# Patient Record
Sex: FEMALE | Race: WHITE | NOT HISPANIC OR LATINO
[De-identification: names, ages, dates, MRNs, and addresses within clinical notes are randomized per-mention and may not be internally consistent; named-entity substitution may affect disease eponyms.]

---

## 2017-01-31 ENCOUNTER — APPOINTMENT (OUTPATIENT)
Dept: ALLERGY | Facility: CLINIC | Age: 27
End: 2017-01-31

## 2017-01-31 ENCOUNTER — RECORD ABSTRACTING (OUTPATIENT)
Age: 27
End: 2017-01-31

## 2017-01-31 VITALS
SYSTOLIC BLOOD PRESSURE: 110 MMHG | RESPIRATION RATE: 14 BRPM | WEIGHT: 115 LBS | HEART RATE: 80 BPM | DIASTOLIC BLOOD PRESSURE: 80 MMHG | BODY MASS INDEX: 20.38 KG/M2 | HEIGHT: 63 IN

## 2017-01-31 DIAGNOSIS — J30.9 ALLERGIC RHINITIS, UNSPECIFIED: ICD-10-CM

## 2017-01-31 DIAGNOSIS — Z87.891 PERSONAL HISTORY OF NICOTINE DEPENDENCE: ICD-10-CM

## 2017-01-31 DIAGNOSIS — J45.20 MILD INTERMITTENT ASTHMA, UNCOMPLICATED: ICD-10-CM

## 2017-01-31 RX ORDER — SERTRALINE HYDROCHLORIDE 50 MG/1
50 TABLET, FILM COATED ORAL
Refills: 0 | Status: ACTIVE | COMMUNITY

## 2017-01-31 RX ORDER — TRAZODONE HYDROCHLORIDE 50 MG/1
50 TABLET ORAL
Refills: 0 | Status: ACTIVE | COMMUNITY

## 2017-01-31 RX ORDER — FLUTICASONE PROPIONATE 50 UG/1
50 SPRAY, METERED NASAL DAILY
Qty: 30 | Refills: 2 | Status: ACTIVE | COMMUNITY
Start: 2017-01-31 | End: 1900-01-01

## 2017-01-31 RX ORDER — ALBUTEROL SULFATE 90 UG/1
108 (90 BASE) AEROSOL, METERED RESPIRATORY (INHALATION)
Qty: 1 | Refills: 2 | Status: ACTIVE | COMMUNITY
Start: 2017-01-31 | End: 1900-01-01

## 2017-01-31 RX ORDER — CETIRIZINE HCL 10 MG
TABLET ORAL
Refills: 0 | Status: ACTIVE | COMMUNITY

## 2017-01-31 RX ORDER — DROSPIRENONE AND ETHINYL ESTRADIOL 0.03MG-3MG
KIT ORAL
Refills: 0 | Status: ACTIVE | COMMUNITY

## 2017-02-06 ENCOUNTER — APPOINTMENT (OUTPATIENT)
Dept: ALLERGY | Facility: CLINIC | Age: 27
End: 2017-02-06

## 2017-02-13 ENCOUNTER — APPOINTMENT (OUTPATIENT)
Dept: ALLERGY | Facility: CLINIC | Age: 27
End: 2017-02-13

## 2017-02-15 ENCOUNTER — APPOINTMENT (OUTPATIENT)
Dept: ALLERGY | Facility: CLINIC | Age: 27
End: 2017-02-15

## 2017-02-16 ENCOUNTER — APPOINTMENT (OUTPATIENT)
Dept: ALLERGY | Facility: CLINIC | Age: 27
End: 2017-02-16

## 2017-02-27 ENCOUNTER — APPOINTMENT (OUTPATIENT)
Dept: ALLERGY | Facility: CLINIC | Age: 27
End: 2017-02-27

## 2017-02-27 VITALS — WEIGHT: 115 LBS | HEIGHT: 63 IN | BODY MASS INDEX: 20.38 KG/M2 | RESPIRATION RATE: 14 BRPM | HEART RATE: 72 BPM

## 2017-03-01 ENCOUNTER — APPOINTMENT (OUTPATIENT)
Dept: ALLERGY | Facility: CLINIC | Age: 27
End: 2017-03-01

## 2017-03-02 ENCOUNTER — APPOINTMENT (OUTPATIENT)
Dept: ALLERGY | Facility: CLINIC | Age: 27
End: 2017-03-02

## 2017-04-17 RX ORDER — FLUTICASONE PROPIONATE 50 UG/1
50 SPRAY, METERED NASAL DAILY
Qty: 1 | Refills: 3 | Status: ACTIVE | COMMUNITY
Start: 2017-04-17 | End: 1900-01-01

## 2017-06-19 RX ORDER — FLUTICASONE PROPIONATE 50 UG/1
50 SPRAY, METERED NASAL DAILY
Qty: 1 | Refills: 2 | Status: ACTIVE | COMMUNITY
Start: 2017-06-19 | End: 1900-01-01

## 2017-09-04 ENCOUNTER — EMERGENCY (EMERGENCY)
Facility: HOSPITAL | Age: 27
LOS: 1 days | Discharge: HOME | End: 2017-09-04
Attending: EMERGENCY MEDICINE | Admitting: EMERGENCY MEDICINE
Payer: COMMERCIAL

## 2017-09-04 VITALS
OXYGEN SATURATION: 100 % | SYSTOLIC BLOOD PRESSURE: 115 MMHG | TEMPERATURE: 99 F | RESPIRATION RATE: 16 BRPM | DIASTOLIC BLOOD PRESSURE: 76 MMHG | HEART RATE: 88 BPM

## 2017-09-04 VITALS
HEART RATE: 113 BPM | RESPIRATION RATE: 17 BRPM | DIASTOLIC BLOOD PRESSURE: 92 MMHG | OXYGEN SATURATION: 100 % | TEMPERATURE: 98 F | SYSTOLIC BLOOD PRESSURE: 138 MMHG

## 2017-09-04 LAB
ALBUMIN SERPL ELPH-MCNC: 3.9 G/DL — SIGNIFICANT CHANGE UP (ref 3.3–5)
ALP SERPL-CCNC: 55 U/L — SIGNIFICANT CHANGE UP (ref 40–120)
ALT FLD-CCNC: 13 U/L RC — SIGNIFICANT CHANGE UP (ref 10–45)
ANION GAP SERPL CALC-SCNC: 17 MMOL/L — SIGNIFICANT CHANGE UP (ref 5–17)
APPEARANCE UR: ABNORMAL
AST SERPL-CCNC: 20 U/L — SIGNIFICANT CHANGE UP (ref 10–40)
BACTERIA # UR AUTO: ABNORMAL /HPF
BILIRUB SERPL-MCNC: 0.5 MG/DL — SIGNIFICANT CHANGE UP (ref 0.2–1.2)
BILIRUB UR-MCNC: NEGATIVE — SIGNIFICANT CHANGE UP
BUN SERPL-MCNC: 8 MG/DL — SIGNIFICANT CHANGE UP (ref 7–23)
CALCIUM SERPL-MCNC: 9 MG/DL — SIGNIFICANT CHANGE UP (ref 8.4–10.5)
CHLORIDE SERPL-SCNC: 89 MMOL/L — LOW (ref 96–108)
CO2 SERPL-SCNC: 23 MMOL/L — SIGNIFICANT CHANGE UP (ref 22–31)
COLOR SPEC: YELLOW — SIGNIFICANT CHANGE UP
CREAT SERPL-MCNC: 0.53 MG/DL — SIGNIFICANT CHANGE UP (ref 0.5–1.3)
DIFF PNL FLD: ABNORMAL
EPI CELLS # UR: SIGNIFICANT CHANGE UP /HPF
GLUCOSE SERPL-MCNC: 90 MG/DL — SIGNIFICANT CHANGE UP (ref 70–99)
GLUCOSE UR QL: NEGATIVE — SIGNIFICANT CHANGE UP
HCT VFR BLD CALC: 37.8 % — SIGNIFICANT CHANGE UP (ref 34.5–45)
HGB BLD-MCNC: 12.5 G/DL — SIGNIFICANT CHANGE UP (ref 11.5–15.5)
KETONES UR-MCNC: NEGATIVE — SIGNIFICANT CHANGE UP
LEUKOCYTE ESTERASE UR-ACNC: ABNORMAL
MCHC RBC-ENTMCNC: 28.9 PG — SIGNIFICANT CHANGE UP (ref 27–34)
MCHC RBC-ENTMCNC: 33.1 GM/DL — SIGNIFICANT CHANGE UP (ref 32–36)
MCV RBC AUTO: 87.4 FL — SIGNIFICANT CHANGE UP (ref 80–100)
NITRITE UR-MCNC: POSITIVE
PH UR: 7 — SIGNIFICANT CHANGE UP (ref 5–8)
PLATELET # BLD AUTO: 145 K/UL — LOW (ref 150–400)
POTASSIUM SERPL-MCNC: 3.2 MMOL/L — LOW (ref 3.5–5.3)
POTASSIUM SERPL-SCNC: 3.2 MMOL/L — LOW (ref 3.5–5.3)
PROT SERPL-MCNC: 7.2 G/DL — SIGNIFICANT CHANGE UP (ref 6–8.3)
PROT UR-MCNC: NEGATIVE — SIGNIFICANT CHANGE UP
RBC # BLD: 4.32 M/UL — SIGNIFICANT CHANGE UP (ref 3.8–5.2)
RBC # FLD: 11.5 % — SIGNIFICANT CHANGE UP (ref 10.3–14.5)
RBC CASTS # UR COMP ASSIST: ABNORMAL /HPF (ref 0–2)
SODIUM SERPL-SCNC: 129 MMOL/L — LOW (ref 135–145)
SP GR SPEC: 1.01 — LOW (ref 1.01–1.02)
UROBILINOGEN FLD QL: NEGATIVE — SIGNIFICANT CHANGE UP
WBC # BLD: 13.3 K/UL — HIGH (ref 3.8–10.5)
WBC # FLD AUTO: 13.3 K/UL — HIGH (ref 3.8–10.5)
WBC UR QL: >50 /HPF (ref 0–5)

## 2017-09-04 PROCEDURE — 81001 URINALYSIS AUTO W/SCOPE: CPT

## 2017-09-04 PROCEDURE — 80053 COMPREHEN METABOLIC PANEL: CPT

## 2017-09-04 PROCEDURE — 99284 EMERGENCY DEPT VISIT MOD MDM: CPT | Mod: 25

## 2017-09-04 PROCEDURE — 85027 COMPLETE CBC AUTOMATED: CPT

## 2017-09-04 PROCEDURE — 87186 SC STD MICRODIL/AGAR DIL: CPT

## 2017-09-04 PROCEDURE — 96374 THER/PROPH/DIAG INJ IV PUSH: CPT

## 2017-09-04 PROCEDURE — 99284 EMERGENCY DEPT VISIT MOD MDM: CPT

## 2017-09-04 PROCEDURE — 96375 TX/PRO/DX INJ NEW DRUG ADDON: CPT

## 2017-09-04 PROCEDURE — 87086 URINE CULTURE/COLONY COUNT: CPT

## 2017-09-04 PROCEDURE — 96372 THER/PROPH/DIAG INJ SC/IM: CPT | Mod: XU

## 2017-09-04 RX ORDER — PHENAZOPYRIDINE HCL 100 MG
100 TABLET ORAL ONCE
Qty: 0 | Refills: 0 | Status: COMPLETED | OUTPATIENT
Start: 2017-09-04 | End: 2017-09-04

## 2017-09-04 RX ORDER — CEPHALEXIN 500 MG
500 CAPSULE ORAL ONCE
Qty: 0 | Refills: 0 | Status: COMPLETED | OUTPATIENT
Start: 2017-09-04 | End: 2017-09-04

## 2017-09-04 RX ORDER — CEPHALEXIN 500 MG
1 CAPSULE ORAL
Qty: 14 | Refills: 0 | OUTPATIENT
Start: 2017-09-04 | End: 2017-09-11

## 2017-09-04 RX ORDER — CEFTRIAXONE 500 MG/1
1 INJECTION, POWDER, FOR SOLUTION INTRAMUSCULAR; INTRAVENOUS EVERY 24 HOURS
Qty: 0 | Refills: 0 | Status: DISCONTINUED | OUTPATIENT
Start: 2017-09-04 | End: 2017-09-08

## 2017-09-04 RX ORDER — ONDANSETRON 8 MG/1
4 TABLET, FILM COATED ORAL ONCE
Qty: 0 | Refills: 0 | Status: COMPLETED | OUTPATIENT
Start: 2017-09-04 | End: 2017-09-04

## 2017-09-04 RX ORDER — ACETAMINOPHEN 500 MG
975 TABLET ORAL ONCE
Qty: 0 | Refills: 0 | Status: COMPLETED | OUTPATIENT
Start: 2017-09-04 | End: 2017-09-04

## 2017-09-04 RX ORDER — KETOROLAC TROMETHAMINE 30 MG/ML
30 SYRINGE (ML) INJECTION ONCE
Qty: 0 | Refills: 0 | Status: DISCONTINUED | OUTPATIENT
Start: 2017-09-04 | End: 2017-09-04

## 2017-09-04 RX ORDER — MORPHINE SULFATE 50 MG/1
4 CAPSULE, EXTENDED RELEASE ORAL ONCE
Qty: 0 | Refills: 0 | Status: DISCONTINUED | OUTPATIENT
Start: 2017-09-04 | End: 2017-09-04

## 2017-09-04 RX ORDER — PHENAZOPYRIDINE HCL 100 MG
1 TABLET ORAL
Qty: 9 | Refills: 0 | OUTPATIENT
Start: 2017-09-04 | End: 2017-09-07

## 2017-09-04 RX ORDER — SODIUM CHLORIDE 9 MG/ML
1000 INJECTION INTRAMUSCULAR; INTRAVENOUS; SUBCUTANEOUS ONCE
Qty: 0 | Refills: 0 | Status: COMPLETED | OUTPATIENT
Start: 2017-09-04 | End: 2017-09-04

## 2017-09-04 RX ADMIN — Medication 975 MILLIGRAM(S): at 19:34

## 2017-09-04 RX ADMIN — Medication 30 MILLIGRAM(S): at 19:34

## 2017-09-04 RX ADMIN — Medication 100 MILLIGRAM(S): at 16:10

## 2017-09-04 RX ADMIN — MORPHINE SULFATE 4 MILLIGRAM(S): 50 CAPSULE, EXTENDED RELEASE ORAL at 19:34

## 2017-09-04 RX ADMIN — Medication 975 MILLIGRAM(S): at 16:24

## 2017-09-04 RX ADMIN — MORPHINE SULFATE 4 MILLIGRAM(S): 50 CAPSULE, EXTENDED RELEASE ORAL at 19:18

## 2017-09-04 RX ADMIN — ONDANSETRON 4 MILLIGRAM(S): 8 TABLET, FILM COATED ORAL at 16:24

## 2017-09-04 RX ADMIN — SODIUM CHLORIDE 1000 MILLILITER(S): 9 INJECTION INTRAMUSCULAR; INTRAVENOUS; SUBCUTANEOUS at 18:04

## 2017-09-04 RX ADMIN — ONDANSETRON 4 MILLIGRAM(S): 8 TABLET, FILM COATED ORAL at 18:04

## 2017-09-04 RX ADMIN — Medication 30 MILLIGRAM(S): at 17:36

## 2017-09-04 RX ADMIN — CEFTRIAXONE 100 GRAM(S): 500 INJECTION, POWDER, FOR SOLUTION INTRAMUSCULAR; INTRAVENOUS at 18:04

## 2017-09-04 NOTE — ED PROVIDER NOTE - OBJECTIVE STATEMENT
27y F here with suprapubic burning pain and difficulty urinating x2 hours. Pt states when she voided this AM she had burning and then drank several bottles of water rapidly because she thought she was dehydrated. After drinking the water she became nauseated and vomited. She reports feeling urge to void but is unable. No vaginal discharge or bleeding, No hx of STDs.

## 2017-09-04 NOTE — ED ADULT NURSE NOTE - OBJECTIVE STATEMENT
pt ambulatory to room 21 c/o sudden onset of burning in bladder and inabil;ity to urinate, pt sts she drank copius amts of water and vomited now with nausea. pt awake alert color good skin warm dry lungs cl abd soft positive suprapubic pain bladder scanner shows 272 cc .

## 2017-09-04 NOTE — ED PROVIDER NOTE - MEDICAL DECISION MAKING DETAILS
27y F here with suprapubic burning pain and difficulty urinating. +Nausea, vomiting, No fever.   Plan: Labs, UA, Ucx, meds, re-eval

## 2017-09-04 NOTE — ED ADULT NURSE REASSESSMENT NOTE - NS ED NURSE REASSESS COMMENT FT1
pt vomited large amt of water and is unable to tolerate po. . iv placed r ac pt receiving bolus of saleine and IV antibiotic

## 2017-09-04 NOTE — ED PROVIDER NOTE - PHYSICAL EXAMINATION
Gen: WNWD NAD  HEENT: NCAT PERRL EOMI normal pharynx  Neck: supple no JVD  CV: RRR, no murmur  Lung: CTA BL  Abd: +BS soft ND mild suprapubic TTP no CVAT, bladder scan between 200-300cc urine  Ext: wwp, palp pulses, FROMx4, no cce  Neuro: CN grossly intact, sensation intact, motor 5/5 throughout

## 2017-09-04 NOTE — ED PROVIDER NOTE - PROGRESS NOTE DETAILS
Dr. Hernandes:  Pt tolerating PO and voiding. Dr. Hernandes: Pt vomited water she drank before. Will place IV for fluids and Abx. Plan to re-eval post fluids and zofran and DC. Dr. Hernandes:Pt tolerating PO and voiding. pt feeling better, wants to leave, keflex 7 days and outpt followup with pmd.   joe jennings.

## 2017-12-04 ENCOUNTER — APPOINTMENT (OUTPATIENT)
Dept: ULTRASOUND IMAGING | Facility: CLINIC | Age: 27
End: 2017-12-04

## 2017-12-11 ENCOUNTER — RESULT REVIEW (OUTPATIENT)
Age: 27
End: 2017-12-11

## 2017-12-12 ENCOUNTER — APPOINTMENT (OUTPATIENT)
Dept: ULTRASOUND IMAGING | Facility: CLINIC | Age: 27
End: 2017-12-12

## 2017-12-18 ENCOUNTER — APPOINTMENT (OUTPATIENT)
Dept: CT IMAGING | Facility: CLINIC | Age: 27
End: 2017-12-18
Payer: COMMERCIAL

## 2017-12-18 ENCOUNTER — OUTPATIENT (OUTPATIENT)
Dept: OUTPATIENT SERVICES | Facility: HOSPITAL | Age: 27
LOS: 1 days | End: 2017-12-18
Payer: COMMERCIAL

## 2017-12-18 DIAGNOSIS — Z00.8 ENCOUNTER FOR OTHER GENERAL EXAMINATION: ICD-10-CM

## 2017-12-18 PROCEDURE — 74178 CT ABD&PLV WO CNTR FLWD CNTR: CPT

## 2017-12-18 PROCEDURE — 74178 CT ABD&PLV WO CNTR FLWD CNTR: CPT | Mod: 26

## 2017-12-28 ENCOUNTER — RESULT REVIEW (OUTPATIENT)
Age: 27
End: 2017-12-28

## 2018-07-18 ENCOUNTER — INPATIENT (INPATIENT)
Facility: HOSPITAL | Age: 28
LOS: 4 days | Discharge: HOME | DRG: 862 | End: 2018-07-23
Attending: INTERNAL MEDICINE | Admitting: INTERNAL MEDICINE
Payer: COMMERCIAL

## 2018-07-18 VITALS
TEMPERATURE: 101 F | RESPIRATION RATE: 16 BRPM | DIASTOLIC BLOOD PRESSURE: 64 MMHG | HEIGHT: 63 IN | OXYGEN SATURATION: 100 % | SYSTOLIC BLOOD PRESSURE: 110 MMHG | HEART RATE: 103 BPM | WEIGHT: 119.93 LBS

## 2018-07-18 DIAGNOSIS — Z29.9 ENCOUNTER FOR PROPHYLACTIC MEASURES, UNSPECIFIED: ICD-10-CM

## 2018-07-18 DIAGNOSIS — G47.00 INSOMNIA, UNSPECIFIED: ICD-10-CM

## 2018-07-18 DIAGNOSIS — Z98.890 OTHER SPECIFIED POSTPROCEDURAL STATES: Chronic | ICD-10-CM

## 2018-07-18 DIAGNOSIS — N39.0 URINARY TRACT INFECTION, SITE NOT SPECIFIED: ICD-10-CM

## 2018-07-18 DIAGNOSIS — D69.59 OTHER SECONDARY THROMBOCYTOPENIA: ICD-10-CM

## 2018-07-18 DIAGNOSIS — N89.8 OTHER SPECIFIED NONINFLAMMATORY DISORDERS OF VAGINA: ICD-10-CM

## 2018-07-18 DIAGNOSIS — F32.9 MAJOR DEPRESSIVE DISORDER, SINGLE EPISODE, UNSPECIFIED: ICD-10-CM

## 2018-07-18 DIAGNOSIS — N30.10 INTERSTITIAL CYSTITIS (CHRONIC) WITHOUT HEMATURIA: ICD-10-CM

## 2018-07-18 LAB
ALBUMIN SERPL ELPH-MCNC: 3.5 G/DL — SIGNIFICANT CHANGE UP (ref 3.3–5)
ALP SERPL-CCNC: 65 U/L — SIGNIFICANT CHANGE UP (ref 40–120)
ALT FLD-CCNC: 27 U/L — SIGNIFICANT CHANGE UP (ref 12–78)
ANION GAP SERPL CALC-SCNC: 6 MMOL/L — SIGNIFICANT CHANGE UP (ref 5–17)
ANION GAP SERPL CALC-SCNC: 9 MMOL/L — SIGNIFICANT CHANGE UP (ref 5–17)
APPEARANCE UR: CLEAR — SIGNIFICANT CHANGE UP
APTT BLD: 42.3 SEC — HIGH (ref 27.5–37.4)
AST SERPL-CCNC: 28 U/L — SIGNIFICANT CHANGE UP (ref 15–37)
BACTERIA # UR AUTO: ABNORMAL
BASOPHILS # BLD AUTO: 0.01 K/UL — SIGNIFICANT CHANGE UP (ref 0–0.2)
BASOPHILS # BLD AUTO: 0.02 K/UL — SIGNIFICANT CHANGE UP (ref 0–0.2)
BASOPHILS NFR BLD AUTO: 0.2 % — SIGNIFICANT CHANGE UP (ref 0–2)
BASOPHILS NFR BLD AUTO: 0.3 % — SIGNIFICANT CHANGE UP (ref 0–2)
BILIRUB SERPL-MCNC: 0.3 MG/DL — SIGNIFICANT CHANGE UP (ref 0.2–1.2)
BILIRUB UR-MCNC: ABNORMAL
BUN SERPL-MCNC: 4 MG/DL — LOW (ref 7–23)
BUN SERPL-MCNC: 5 MG/DL — LOW (ref 7–23)
CALCIUM SERPL-MCNC: 7.6 MG/DL — LOW (ref 8.5–10.1)
CALCIUM SERPL-MCNC: 8.8 MG/DL — SIGNIFICANT CHANGE UP (ref 8.5–10.1)
CHLORIDE SERPL-SCNC: 108 MMOL/L — SIGNIFICANT CHANGE UP (ref 96–108)
CHLORIDE SERPL-SCNC: 99 MMOL/L — SIGNIFICANT CHANGE UP (ref 96–108)
CO2 SERPL-SCNC: 26 MMOL/L — SIGNIFICANT CHANGE UP (ref 22–31)
CO2 SERPL-SCNC: 27 MMOL/L — SIGNIFICANT CHANGE UP (ref 22–31)
COLOR SPEC: YELLOW — SIGNIFICANT CHANGE UP
CREAT SERPL-MCNC: 0.64 MG/DL — SIGNIFICANT CHANGE UP (ref 0.5–1.3)
CREAT SERPL-MCNC: 0.76 MG/DL — SIGNIFICANT CHANGE UP (ref 0.5–1.3)
DIFF PNL FLD: ABNORMAL
EOSINOPHIL # BLD AUTO: 0.01 K/UL — SIGNIFICANT CHANGE UP (ref 0–0.5)
EOSINOPHIL # BLD AUTO: 0.01 K/UL — SIGNIFICANT CHANGE UP (ref 0–0.5)
EOSINOPHIL NFR BLD AUTO: 0.2 % — SIGNIFICANT CHANGE UP (ref 0–6)
EOSINOPHIL NFR BLD AUTO: 0.2 % — SIGNIFICANT CHANGE UP (ref 0–6)
EPI CELLS # UR: ABNORMAL
GLUCOSE SERPL-MCNC: 112 MG/DL — HIGH (ref 70–99)
GLUCOSE SERPL-MCNC: 89 MG/DL — SIGNIFICANT CHANGE UP (ref 70–99)
GLUCOSE UR QL: NEGATIVE — SIGNIFICANT CHANGE UP
HCG SERPL-ACNC: <1 MIU/ML — SIGNIFICANT CHANGE UP
HCT VFR BLD CALC: 34.5 % — SIGNIFICANT CHANGE UP (ref 34.5–45)
HCT VFR BLD CALC: 39.4 % — SIGNIFICANT CHANGE UP (ref 34.5–45)
HGB BLD-MCNC: 11.6 G/DL — SIGNIFICANT CHANGE UP (ref 11.5–15.5)
HGB BLD-MCNC: 13.2 G/DL — SIGNIFICANT CHANGE UP (ref 11.5–15.5)
IMM GRANULOCYTES NFR BLD AUTO: 0.3 % — SIGNIFICANT CHANGE UP (ref 0–1.5)
IMM GRANULOCYTES NFR BLD AUTO: 0.3 % — SIGNIFICANT CHANGE UP (ref 0–1.5)
INR BLD: 1.08 RATIO — SIGNIFICANT CHANGE UP (ref 0.88–1.16)
KETONES UR-MCNC: NEGATIVE — SIGNIFICANT CHANGE UP
LACTATE SERPL-SCNC: 1.4 MMOL/L — SIGNIFICANT CHANGE UP (ref 0.7–2)
LEUKOCYTE ESTERASE UR-ACNC: ABNORMAL
LYMPHOCYTES # BLD AUTO: 1.36 K/UL — SIGNIFICANT CHANGE UP (ref 1–3.3)
LYMPHOCYTES # BLD AUTO: 1.38 K/UL — SIGNIFICANT CHANGE UP (ref 1–3.3)
LYMPHOCYTES # BLD AUTO: 21.2 % — SIGNIFICANT CHANGE UP (ref 13–44)
LYMPHOCYTES # BLD AUTO: 21.9 % — SIGNIFICANT CHANGE UP (ref 13–44)
MCHC RBC-ENTMCNC: 28.4 PG — SIGNIFICANT CHANGE UP (ref 27–34)
MCHC RBC-ENTMCNC: 28.4 PG — SIGNIFICANT CHANGE UP (ref 27–34)
MCHC RBC-ENTMCNC: 33.5 GM/DL — SIGNIFICANT CHANGE UP (ref 32–36)
MCHC RBC-ENTMCNC: 33.6 GM/DL — SIGNIFICANT CHANGE UP (ref 32–36)
MCV RBC AUTO: 84.6 FL — SIGNIFICANT CHANGE UP (ref 80–100)
MCV RBC AUTO: 84.9 FL — SIGNIFICANT CHANGE UP (ref 80–100)
MONOCYTES # BLD AUTO: 0.83 K/UL — SIGNIFICANT CHANGE UP (ref 0–0.9)
MONOCYTES # BLD AUTO: 0.97 K/UL — HIGH (ref 0–0.9)
MONOCYTES NFR BLD AUTO: 12.9 % — SIGNIFICANT CHANGE UP (ref 2–14)
MONOCYTES NFR BLD AUTO: 15.4 % — HIGH (ref 2–14)
NEUTROPHILS # BLD AUTO: 3.91 K/UL — SIGNIFICANT CHANGE UP (ref 1.8–7.4)
NEUTROPHILS # BLD AUTO: 4.19 K/UL — SIGNIFICANT CHANGE UP (ref 1.8–7.4)
NEUTROPHILS NFR BLD AUTO: 62 % — SIGNIFICANT CHANGE UP (ref 43–77)
NEUTROPHILS NFR BLD AUTO: 65.1 % — SIGNIFICANT CHANGE UP (ref 43–77)
NITRITE UR-MCNC: POSITIVE
NRBC # BLD: 0 /100 WBCS — SIGNIFICANT CHANGE UP (ref 0–0)
PH UR: 6.5 — SIGNIFICANT CHANGE UP (ref 5–8)
PLATELET # BLD AUTO: 64 K/UL — LOW (ref 150–400)
PLATELET # BLD AUTO: 71 K/UL — LOW (ref 150–400)
POTASSIUM SERPL-MCNC: 3.5 MMOL/L — SIGNIFICANT CHANGE UP (ref 3.5–5.3)
POTASSIUM SERPL-MCNC: 4 MMOL/L — SIGNIFICANT CHANGE UP (ref 3.5–5.3)
POTASSIUM SERPL-SCNC: 3.5 MMOL/L — SIGNIFICANT CHANGE UP (ref 3.5–5.3)
POTASSIUM SERPL-SCNC: 4 MMOL/L — SIGNIFICANT CHANGE UP (ref 3.5–5.3)
PROT SERPL-MCNC: 8.3 G/DL — SIGNIFICANT CHANGE UP (ref 6–8.3)
PROT UR-MCNC: 25 MG/DL
PROTHROM AB SERPL-ACNC: 11.8 SEC — SIGNIFICANT CHANGE UP (ref 9.8–12.7)
RBC # BLD: 4.08 M/UL — SIGNIFICANT CHANGE UP (ref 3.8–5.2)
RBC # BLD: 4.64 M/UL — SIGNIFICANT CHANGE UP (ref 3.8–5.2)
RBC # FLD: 13 % — SIGNIFICANT CHANGE UP (ref 10.3–14.5)
RBC # FLD: 13.1 % — SIGNIFICANT CHANGE UP (ref 10.3–14.5)
RBC CASTS # UR COMP ASSIST: ABNORMAL /HPF (ref 0–4)
SODIUM SERPL-SCNC: 135 MMOL/L — SIGNIFICANT CHANGE UP (ref 135–145)
SODIUM SERPL-SCNC: 140 MMOL/L — SIGNIFICANT CHANGE UP (ref 135–145)
SP GR SPEC: 1 — LOW (ref 1.01–1.02)
UROBILINOGEN FLD QL: 4
WBC # BLD: 6.3 K/UL — SIGNIFICANT CHANGE UP (ref 3.8–10.5)
WBC # BLD: 6.43 K/UL — SIGNIFICANT CHANGE UP (ref 3.8–10.5)
WBC # FLD AUTO: 6.3 K/UL — SIGNIFICANT CHANGE UP (ref 3.8–10.5)
WBC # FLD AUTO: 6.43 K/UL — SIGNIFICANT CHANGE UP (ref 3.8–10.5)
WBC UR QL: ABNORMAL

## 2018-07-18 PROCEDURE — 71046 X-RAY EXAM CHEST 2 VIEWS: CPT | Mod: 26

## 2018-07-18 PROCEDURE — 93010 ELECTROCARDIOGRAM REPORT: CPT

## 2018-07-18 PROCEDURE — 99285 EMERGENCY DEPT VISIT HI MDM: CPT

## 2018-07-18 RX ORDER — SODIUM CHLORIDE 9 MG/ML
1000 INJECTION INTRAMUSCULAR; INTRAVENOUS; SUBCUTANEOUS
Qty: 0 | Refills: 0 | Status: DISCONTINUED | OUTPATIENT
Start: 2018-07-18 | End: 2018-07-23

## 2018-07-18 RX ORDER — SODIUM CHLORIDE 9 MG/ML
1000 INJECTION INTRAMUSCULAR; INTRAVENOUS; SUBCUTANEOUS
Qty: 0 | Refills: 0 | Status: COMPLETED | OUTPATIENT
Start: 2018-07-18 | End: 2018-07-18

## 2018-07-18 RX ORDER — ACETAMINOPHEN 500 MG
650 TABLET ORAL ONCE
Qty: 0 | Refills: 0 | Status: COMPLETED | OUTPATIENT
Start: 2018-07-18 | End: 2018-07-18

## 2018-07-18 RX ORDER — AZTREONAM 2 G
1000 VIAL (EA) INJECTION ONCE
Qty: 0 | Refills: 0 | Status: COMPLETED | OUTPATIENT
Start: 2018-07-18 | End: 2018-07-18

## 2018-07-18 RX ORDER — CEFTRIAXONE 500 MG/1
1 INJECTION, POWDER, FOR SOLUTION INTRAMUSCULAR; INTRAVENOUS ONCE
Qty: 0 | Refills: 0 | Status: COMPLETED | OUTPATIENT
Start: 2018-07-18 | End: 2018-07-18

## 2018-07-18 RX ORDER — ONDANSETRON 8 MG/1
4 TABLET, FILM COATED ORAL EVERY 6 HOURS
Qty: 0 | Refills: 0 | Status: DISCONTINUED | OUTPATIENT
Start: 2018-07-18 | End: 2018-07-23

## 2018-07-18 RX ORDER — ESCITALOPRAM OXALATE 10 MG/1
15 TABLET, FILM COATED ORAL
Qty: 0 | Refills: 0 | COMMUNITY

## 2018-07-18 RX ORDER — FLUCONAZOLE 150 MG/1
200 TABLET ORAL DAILY
Qty: 0 | Refills: 0 | Status: COMPLETED | OUTPATIENT
Start: 2018-07-18 | End: 2018-07-21

## 2018-07-18 RX ORDER — CEFTRIAXONE 500 MG/1
1 INJECTION, POWDER, FOR SOLUTION INTRAMUSCULAR; INTRAVENOUS EVERY 24 HOURS
Qty: 0 | Refills: 0 | Status: DISCONTINUED | OUTPATIENT
Start: 2018-07-18 | End: 2018-07-18

## 2018-07-18 RX ORDER — DROSPIRENONE AND ETHINYL ESTRADIOL 0.03MG-3MG
1 KIT ORAL
Qty: 0 | Refills: 0 | COMMUNITY

## 2018-07-18 RX ORDER — ACETAMINOPHEN 500 MG
650 TABLET ORAL EVERY 6 HOURS
Qty: 0 | Refills: 0 | Status: DISCONTINUED | OUTPATIENT
Start: 2018-07-18 | End: 2018-07-23

## 2018-07-18 RX ORDER — PHENAZOPYRIDINE HCL 100 MG
100 TABLET ORAL EVERY 8 HOURS
Qty: 0 | Refills: 0 | Status: DISCONTINUED | OUTPATIENT
Start: 2018-07-18 | End: 2018-07-23

## 2018-07-18 RX ORDER — TRAZODONE HCL 50 MG
1 TABLET ORAL
Qty: 0 | Refills: 0 | COMMUNITY

## 2018-07-18 RX ORDER — PHENAZOPYRIDINE HCL 100 MG
2 TABLET ORAL
Qty: 0 | Refills: 0 | COMMUNITY

## 2018-07-18 RX ORDER — SODIUM CHLORIDE 0.65 %
2 AEROSOL, SPRAY (ML) NASAL ONCE
Qty: 0 | Refills: 0 | Status: DISCONTINUED | OUTPATIENT
Start: 2018-07-18 | End: 2018-07-18

## 2018-07-18 RX ORDER — SODIUM CHLORIDE 9 MG/ML
1000 INJECTION INTRAMUSCULAR; INTRAVENOUS; SUBCUTANEOUS
Qty: 0 | Refills: 0 | Status: DISCONTINUED | OUTPATIENT
Start: 2018-07-18 | End: 2018-07-18

## 2018-07-18 RX ORDER — FLUTICASONE PROPIONATE 50 MCG
1 SPRAY, SUSPENSION NASAL
Qty: 0 | Refills: 0 | Status: DISCONTINUED | OUTPATIENT
Start: 2018-07-18 | End: 2018-07-23

## 2018-07-18 RX ORDER — DIPHENHYDRAMINE HCL 50 MG
25 CAPSULE ORAL EVERY 6 HOURS
Qty: 0 | Refills: 0 | Status: DISCONTINUED | OUTPATIENT
Start: 2018-07-18 | End: 2018-07-23

## 2018-07-18 RX ORDER — ESCITALOPRAM OXALATE 10 MG/1
15 TABLET, FILM COATED ORAL AT BEDTIME
Qty: 0 | Refills: 0 | Status: DISCONTINUED | OUTPATIENT
Start: 2018-07-18 | End: 2018-07-23

## 2018-07-18 RX ORDER — TRAZODONE HCL 50 MG
50 TABLET ORAL AT BEDTIME
Qty: 0 | Refills: 0 | Status: DISCONTINUED | OUTPATIENT
Start: 2018-07-18 | End: 2018-07-23

## 2018-07-18 RX ORDER — AZTREONAM 2 G
1000 VIAL (EA) INJECTION EVERY 8 HOURS
Qty: 0 | Refills: 0 | Status: DISCONTINUED | OUTPATIENT
Start: 2018-07-18 | End: 2018-07-19

## 2018-07-18 RX ORDER — IBUPROFEN 200 MG
400 TABLET ORAL EVERY 6 HOURS
Qty: 0 | Refills: 0 | Status: DISCONTINUED | OUTPATIENT
Start: 2018-07-18 | End: 2018-07-22

## 2018-07-18 RX ADMIN — Medication 1 SPRAY(S): at 21:00

## 2018-07-18 RX ADMIN — Medication 650 MILLIGRAM(S): at 12:46

## 2018-07-18 RX ADMIN — SODIUM CHLORIDE 75 MILLILITER(S): 9 INJECTION INTRAMUSCULAR; INTRAVENOUS; SUBCUTANEOUS at 18:13

## 2018-07-18 RX ADMIN — SODIUM CHLORIDE 100 MILLILITER(S): 9 INJECTION INTRAMUSCULAR; INTRAVENOUS; SUBCUTANEOUS at 21:48

## 2018-07-18 RX ADMIN — ESCITALOPRAM OXALATE 15 MILLIGRAM(S): 10 TABLET, FILM COATED ORAL at 22:20

## 2018-07-18 RX ADMIN — Medication 50 MILLIGRAM(S): at 22:20

## 2018-07-18 RX ADMIN — SODIUM CHLORIDE 1000 MILLILITER(S): 9 INJECTION INTRAMUSCULAR; INTRAVENOUS; SUBCUTANEOUS at 12:45

## 2018-07-18 RX ADMIN — SODIUM CHLORIDE 1000 MILLILITER(S): 9 INJECTION INTRAMUSCULAR; INTRAVENOUS; SUBCUTANEOUS at 15:22

## 2018-07-18 RX ADMIN — Medication 600 MILLIGRAM(S): at 21:36

## 2018-07-18 RX ADMIN — Medication 650 MILLIGRAM(S): at 21:00

## 2018-07-18 RX ADMIN — Medication 25 MILLIGRAM(S): at 21:47

## 2018-07-18 RX ADMIN — ONDANSETRON 4 MILLIGRAM(S): 8 TABLET, FILM COATED ORAL at 18:13

## 2018-07-18 RX ADMIN — Medication 50 MILLIGRAM(S): at 15:23

## 2018-07-18 RX ADMIN — Medication 50 MILLIGRAM(S): at 21:36

## 2018-07-18 RX ADMIN — Medication 100 MILLIGRAM(S): at 20:06

## 2018-07-18 NOTE — H&P ADULT - ASSESSMENT
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

## 2018-07-18 NOTE — H&P ADULT - PROBLEM SELECTOR PLAN 4
chronic, continue trazadone and lexapro admits to vaginal discharge, white  does not recall consistency  h/o antibiotic use  Gyn Dr Maloney consulted

## 2018-07-18 NOTE — H&P ADULT - PROBLEM SELECTOR PLAN 1
Admit to F  IV Abx  IVF   f/u urine cx  f/u blood cx  Uro consulted Dr Warner Admit to F  IV Rocephin  IVF 75cc/hr  f/u urine cx  f/u blood cx  Uro consulted Dr Warner Admit to F  IV Azactam  IVF 75cc/hr  f/u urine cx  f/u blood cx  Uro consulted Dr Warner

## 2018-07-18 NOTE — H&P ADULT - NSHPREVIEWOFSYSTEMS_GEN_ALL_CORE
Constitutional: +fever, +chills  HEENT: denies blurry vision, difficulty hearing  Respiratory: denies SOB, RUIZ, cough, sputum production, wheezing, hemoptysis  Cardiovascular: denies CP, palpitations, edema  Gastrointestinal: denies nausea, vomiting, diarrhea, constipation, abdominal pain, melena, hematochezia   Genitourinary: denies dysuria, frequency, urgency, hematuria   Skin/Breast: denies rash, itching  Musculoskeletal: denies joint swelling, muscle weakness, +myalgias  Neurologic: denies headache, weakness, dizziness, paresthesias, numbness/tingling  ROS negative except as noted above Constitutional: +fever, +chills  HEENT: denies blurry vision, difficulty hearing  Respiratory: denies SOB, RUIZ, cough, sputum production, wheezing, hemoptysis  Cardiovascular: denies CP, palpitations, edema  Gastrointestinal: denies nausea, vomiting, diarrhea, constipation, abdominal pain, melena, hematochezia   Genitourinary: denies dysuria, frequency, urgency, hematuria +vaginal discharge +bladder pain   Skin/Breast: denies rash, itching +painful cyst in groin   Musculoskeletal: denies joint swelling, muscle weakness, +myalgias  Neurologic: denies headache, weakness, dizziness, paresthesias, numbness/tingling  ROS negative except as noted above

## 2018-07-18 NOTE — ED PROVIDER NOTE - MEDICAL DECISION MAKING DETAILS
pt with fevers, chills, myalgias, loose stools s/p cystoscopy and biopsy 1 week ago - labs/cxr/ivf/tylenol

## 2018-07-18 NOTE — CHART NOTE - NSCHARTNOTEFT_GEN_A_CORE
RN called as patient was complaining of new rash and feeling feverish.  Patient was seen and evaluated at bedside.  Urticarial rash was noted above sternum on patient's chest and on right medial lower extremity by calf.  Patient feels nauseous but was given Zofran, denies itching, not complaining SOB or chest pain.  No angioedema noted on physical exam.  Patient had a temperature of 101.5, but is already on sepsis protocol, receiving IVF, on azcatam, and blood/ urine cultures already drawn.    Vital Signs Last 24 Hrs  T(C): 38.6 (18 Jul 2018 19:52), Max: 39.4 (18 Jul 2018 18:10)  T(F): 101.5 (18 Jul 2018 19:52), Max: 103 (18 Jul 2018 18:10)  HR: 100 (18 Jul 2018 19:52) (86 - 103)  BP: 100/64 (18 Jul 2018 19:52) (100/64 - 110/64)  RR: 18 (18 Jul 2018 19:52) (16 - 18)  SpO2: 96% (18 Jul 2018 19:52) (96% - 100%)    Physical Exam:  General: Well developed, well nourished  Respiratory: CTA B/L, No W/R/R  CV: RRR, +S1/S2, no murmurs, rubs or gallops  Abdominal: Soft, NT, ND +BSx4   Skin: small 1cm erythematous slightly raised plaque/urticarial rash noted above sternum, similar lesion seen on right lower extremity near calf, hyperpigmented patch noted on RUQ of abdomen but patient states this has been there for 4 years    Assessment and Plan:    29yo F with PMH of Depression, Interstitial Cystitis, Insomnia, 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  1. Rash  -Ordered benadryl PRN itching   -no signs of anaphylaxis  -likely due to reaction to antibiotic as pt already allergic to cefaclor  -will continue to monitor if more lesions occur  -RN to notify resident if patient complains of SOB, difficulty breathing RN called as patient was complaining of new rash and feeling feverish.  Patient was seen and evaluated at bedside.  Urticarial rash was noted above sternum on patient's chest and on right medial lower extremity by calf.  Patient feels nauseous but was given Zofran, denies itching, not complaining SOB or chest pain.  No angioedema noted on physical exam.  Patient had a temperature of 101.5, but is already on sepsis protocol, receiving IVF, on azcatam, and blood/ urine cultures already drawn.    Vital Signs Last 24 Hrs  T(C): 38.6 (18 Jul 2018 19:52), Max: 39.4 (18 Jul 2018 18:10)  T(F): 101.5 (18 Jul 2018 19:52), Max: 103 (18 Jul 2018 18:10)  HR: 100 (18 Jul 2018 19:52) (86 - 103)  BP: 100/64 (18 Jul 2018 19:52) (100/64 - 110/64)  RR: 18 (18 Jul 2018 19:52) (16 - 18)  SpO2: 96% (18 Jul 2018 19:52) (96% - 100%)    Physical Exam:  General: Well developed, well nourished  Respiratory: CTA B/L, No W/R/R  CV: RRR, +S1/S2, no murmurs, rubs or gallops  Abdominal: Soft, NT, ND +BSx4   Skin: small 1cm erythematous slightly raised plaque/urticarial rash noted above sternum, similar lesion seen on right lower extremity near calf, hyperpigmented patch noted on RUQ of abdomen but patient states this has been there for 4 years    Assessment and Plan:    27yo F with PMH of Depression, Interstitial Cystitis, Insomnia, 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  1. Rash  -Ordered benadryl PRN itching   -no signs of anaphylaxis  -likely due to reaction to antibiotic as pt already allergic to cefaclor  -will continue to monitor if more lesions occur  -RN to notify resident if patient complains of SOB, difficulty breathing- monitor for now    2. Fever  -patient already on antibiotics, IVF, BCx and UCx already drawn  -patient does not appear septic  -Standing order for Tylenol PRN fever  -continue to monitor vital signs RN called as patient was complaining of new rash and feeling feverish.  Patient was seen and evaluated at bedside.  Urticarial rash was noted above sternum on patient's chest and on right medial lower extremity by calf.  Patient feels nauseous but was given Zofran, denies itching, not complaining SOB or chest pain.  No angioedema noted on physical exam.  Patient had a temperature of 101.5, but is already on sepsis protocol, receiving IVF, on azcatam, and blood/ urine cultures already drawn.    Vital Signs Last 24 Hrs  T(C): 38.6 (18 Jul 2018 19:52), Max: 39.4 (18 Jul 2018 18:10)  T(F): 101.5 (18 Jul 2018 19:52), Max: 103 (18 Jul 2018 18:10)  HR: 100 (18 Jul 2018 19:52) (86 - 103)  BP: 100/64 (18 Jul 2018 19:52) (100/64 - 110/64)  RR: 18 (18 Jul 2018 19:52) (16 - 18)  SpO2: 96% (18 Jul 2018 19:52) (96% - 100%)    Physical Exam:  General: Well developed, well nourished  Respiratory: CTA B/L, No W/R/R  CV: RRR, +S1/S2, no murmurs, rubs or gallops  Abdominal: Soft, NT, ND +BSx4   Skin: small 1cm erythematous slightly raised plaque/urticarial rash noted above sternum, similar lesion seen on right lower extremity near calf, hyperpigmented patch noted on RUQ of abdomen but patient states this has been there for 4 years    Assessment and Plan:    29yo F with PMH of Depression, Interstitial Cystitis, Insomnia, 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  1. Rash  -Ordered benadryl PRN itching   -no signs of anaphylaxis  -likely due to reaction to antibiotic as pt already allergic to cefaclor  -will continue to monitor if more lesions occur  -RN to notify resident if patient complains of SOB, difficulty breathing- monitor for now    2. Fever  -IVF increased to 100cc/hr as pt tachy and has fever  -patient already on antibiotics, BCx and UCx already drawn  -patient does not appear septic  -Standing order for Tylenol PRN fever  -continue to monitor vital signs RN called as patient was complaining of new rash and feeling feverish.  Patient was seen and evaluated at bedside.  Urticarial rash was noted above sternum on patient's chest and on right medial lower extremity by calf.  Patient feels nauseous but was given Zofran, denies itching, not complaining SOB or chest pain.  No angioedema noted on physical exam.  Patient had a temperature of 101.5, but is already on sepsis protocol, receiving IVF, on azcatam, and blood/ urine cultures already drawn.    Vital Signs Last 24 Hrs  T(C): 38.6 (18 Jul 2018 19:52), Max: 39.4 (18 Jul 2018 18:10)  T(F): 101.5 (18 Jul 2018 19:52), Max: 103 (18 Jul 2018 18:10)  HR: 100 (18 Jul 2018 19:52) (86 - 103)  BP: 100/64 (18 Jul 2018 19:52) (100/64 - 110/64)  RR: 18 (18 Jul 2018 19:52) (16 - 18)  SpO2: 96% (18 Jul 2018 19:52) (96% - 100%)    Physical Exam:  General: Well developed, well nourished  Respiratory: CTA B/L, No W/R/R  CV: RRR, +S1/S2, no murmurs, rubs or gallops  Abdominal: Soft, NT, ND +BSx4   Skin: small 1cm erythematous slightly raised plaque/urticarial rash noted above sternum, similar lesion seen on right lower extremity near calf, hyperpigmented patch noted on RUQ of abdomen but patient states this has been there for 4 years    Assessment and Plan:    27yo F with PMH of Depression, Interstitial Cystitis, Insomnia, 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  1. Rash  -Ordered benadryl PRN itching   -no signs of anaphylaxis  -likely due to reaction to antibiotic as pt already allergic to cefaclor  -will continue to monitor if more lesions occur  -RN to notify resident if patient complains of SOB, difficulty breathing- monitor for now    2. Fever  -IVF increased to 100cc/hr as pt tachy and has fever  -patient already on antibiotics, BCx and UCx already drawn  -patient does not appear septic  -Standing order for Tylenol PRN fever  -continue to monitor vital signs      Addendum 7/19/18 04:53  Patient complaining of headache, with associated photophobia and phonophobia, bilateral.  Ordered sumatriptan 25mg.

## 2018-07-18 NOTE — H&P ADULT - PROBLEM SELECTOR PLAN 3
s/p cystoscopy and biopsy 7 days ago  chronic, follow up outpatient s/p cystoscopy and biopsy 7 days ago  continue pyridium

## 2018-07-18 NOTE — H&P ADULT - PROBLEM SELECTOR PLAN 2
Plt 71, 2017 145  f/u cbc in am Plt 71, 2017 145  f/u cbc in am  consult Heme Onc Plt 71, in 2017 Plt 145  likely reactive 2/2 infection  f/u cbc in am

## 2018-07-18 NOTE — ED ADULT NURSE NOTE - OBJECTIVE STATEMENT
Patient reports that she was sent to emergency room by her doctor for a possible antibiotic resistant infection. Patient reports that she has a cyst in her groin, reports vaginal discharge, reports LMP 6/6/18. Patient also reports headache, chills, and a temperature of "102".

## 2018-07-18 NOTE — PROGRESS NOTE ADULT - SUBJECTIVE AND OBJECTIVE BOX
Cc:     HPI: 28y Female Para 0, LMP 6 weeks ago on continuous OCP regimen. Long history of bladder issues being seen by urology. Has also had multiple visits with Dr. Wilkins (gyn) with frequent complaints of vag discharge, with cultures negative. Has been Rxed for bacterial vaginosis in the past. Patient currently notes discharge, no itching or odor.     Past History:     Ob Hx: P0    Gyn Hx: Frequent UTIs, vaginal infections     PAST MEDICAL & SURGICAL HISTORY:  ITP secondary to infection  IC (interstitial cystitis)  Insomnia  Depressed  History of biopsy of bladder  No significant past surgical history      Medications:   acetaminophen   Tablet 650 milliGRAM(s) Oral every 6 hours PRN  aztreonam  IVPB 1000 milliGRAM(s) IV Intermittent every 8 hours  ibuprofen  Tablet 400 milliGRAM(s) Oral every 6 hours PRN  sodium chloride 0.9%. 1000 milliLiter(s) IV Continuous <Continuous>      Allergies    cefaclor (Hives)    Intolerances        Social Hx: noncontrib    FAMILY HISTORY:  No pertinent family history in first degree relatives      Review of Systems:   Constitutional: No fever chills       Physical exam:   Vital Signs Last 24 Hrs  T(C): 37.5 (2018 12:40), Max: 38.1 (2018 11:46)  T(F): 99.5 (2018 12:40), Max: 100.5 (2018 11:46)  HR: 90 (2018 12:40) (88 - 103)  BP: 100/64 (2018 12:40) (100/64 - 110/64)  BP(mean): --  RR: 17 (2018 12:40) (16 - 17)  SpO2: 100% (2018 12:40) (100% - 100%)  Gen: AAO x 3  Abd: Soft, nontender  VE: Unremarkable discharge    LABS:                        13.2   6.43  )-----------( 71       ( 2018 12:35 )             39.4     07-18    135  |  99  |  5<L>  ----------------------------<  89  3.5   |  27  |  0.76    Ca    8.8      2018 12:35    TPro  8.3  /  Alb  3.5  /  TBili  0.3  /  DBili  x   /  AST  28  /  ALT  27  /  AlkPhos  65  -    PT/INR - ( 2018 12:35 )   PT: 11.8 sec;   INR: 1.08 ratio         PTT - ( 2018 12:35 )  PTT:42.3 sec  Urinalysis Basic - ( 2018 12:35 )    Color: Yellow / Appearance: Clear / S.005 / pH: x  Gluc: x / Ketone: Negative  / Bili: Moderate / Urobili: 4   Blood: x / Protein: 25 mg/dL / Nitrite: Positive   Leuk Esterase: Small / RBC: 25-50 /HPF / WBC 6-10   Sq Epi: x / Non Sq Epi: Moderate / Bacteria: Moderate      HCG Quantitative, Serum: <1 mIU/mL ( @ 12:35)        RADIOLOGY & ADDITIONAL STUDIES:  (double click tilde, type "rad", select one and copy results)    A/P: 28y Female   Vaginal discharge  Based on history of near-continuous antibiotic usage, will treat for yeast presumptively  Counseled on physiologic discharge  Advised f/u as outpatient.

## 2018-07-18 NOTE — ED PROVIDER NOTE - OBJECTIVE STATEMENT
To expedite your medication refill(s), please contact your pharmacy and have them fax a refill request to: [phone].  *Please allow 3 business days for routine medication refills.  *Please allow 5 business days for controlled substance medication refills.  --------------------  For scheduling appointments (including lab work), please request an appointment through [employer], or call: [phone].    For questions for your provider or the endocrine nurse, please send a [employer] message.  For after-hours urgent issues, please dial [phone], and ask to speak with the Endocrinologist On-Call.  --------------------  Please Note: If you are active on [employer], all future test results will be sent by [employer] message only and will no longer be sent by mail. You may also receive communication directly from your physician.    
pt c/o fevers, chills, myalgias since 0200 this am. pt reports cystoscopy with biopsy 7 days ago. no ha, cp, sob, cough, abd pain, dysuria. last tylenol approx 5am  [facility]  [facility] - [facility]

## 2018-07-18 NOTE — ED ADULT TRIAGE NOTE - CHIEF COMPLAINT QUOTE
sent by Dr King. pt had bladder biopsy 7/11 by Dr Tabares.  pt developed 102 last night +chills +vaginal discharge  pt meets sepsis criteria

## 2018-07-18 NOTE — H&P ADULT - PMH
Depressed    IC (interstitial cystitis)    Insomnia    ITP secondary to infection    ITP secondary to infection Depressed    IC (interstitial cystitis)    Insomnia    ITP secondary to infection

## 2018-07-18 NOTE — H&P ADULT - NSHPPHYSICALEXAM_GEN_ALL_CORE
T(C): 37.5 (07-18-18 @ 12:40), Max: 38.1 (07-18-18 @ 11:46)  HR: 90 (07-18-18 @ 12:40) (88 - 103)  BP: 100/64 (07-18-18 @ 12:40) (100/64 - 110/64)  RR: 17 (07-18-18 @ 12:40) (16 - 17)  SpO2: 100% (07-18-18 @ 12:40) (100% - 100%)  Wt(kg): --    Physical Exam:  General: Well developed, well nourished, NAD  HEENT: normocephalic, atraumatic, PERRLA, extraocular muscles intact bilaterally, moist mucous membranes   Neck: Supple, nontender, no mass  Neurology: A&Ox3, moves all extremities x4, nonfocal, CN II-XII grossly intact, sensation intact, no gait abnormalities   Respiratory: clear to auscultation B/L, No wheezes, rales, ronchi  CV: RRR, +S1/S2, no murmurs, rubs or gallops  Abdominal: Soft, nontender, nondistended +BSx4  Extremities: No cyanosis/clubbing/edema, + peripheral pulses  MSK: Normal ROM, no joint erythema or warmth, no joint swelling   Skin: warm, dry, normal color, no rash or abnormal lesions T(C): 37.5 (07-18-18 @ 12:40), Max: 38.1 (07-18-18 @ 11:46)  HR: 90 (07-18-18 @ 12:40) (88 - 103)  BP: 100/64 (07-18-18 @ 12:40) (100/64 - 110/64)  RR: 17 (07-18-18 @ 12:40) (16 - 17)  SpO2: 100% (07-18-18 @ 12:40) (100% - 100%)  Wt(kg): --    Physical Exam:  General: Well developed, well nourished, NAD  HEENT: normocephalic, atraumatic, PERRLA, extraocular muscles intact bilaterally, moist mucous membranes   Neck: Supple, nontender, no mass  Neurology: A&Ox3, moves all extremities x4, nonfocal, CN II-XII grossly intact, sensation intact, no gait abnormalities   Respiratory: clear to auscultation B/L, No wheezes, rales, ronchi  CV: RRR, +S1/S2, no murmurs, rubs or gallops  Abdominal: Soft, nontender, nondistended +BSx4  Extremities: No cyanosis/clubbing/edema, + peripheral pulses  MSK: Normal ROM, no joint erythema or warmth, no joint swelling   Skin: warm, dry, normal color, +tender L groin hard mass probable lymph node

## 2018-07-18 NOTE — H&P ADULT - HISTORY OF PRESENT ILLNESS
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 Pt reports having a cystoscopy with biopsy 7 days ago. denies HA, CP, SOB, abd pain, dysyuria.    In the ED, VS /64, , RR 16, Temp 100.5. Labs significant for Plt 71, UA positive nitrite and LEC. CT abd/pelvis negative. 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 Pt reports having a cystoscopy with biopsy 7 days ago. Admits to bladder pain/spasms on and off for the past one year and recurrent UTIs in the past. Pt states she has a tender "cyst" in her groin and vaginal discharge. Can not recall what color, no itching or order. Has been on Cipro. Denies HA, CP, SOB, abd pain, dysuria, frequency.     In the ED, VS /64, , RR 16, Temp 100.5. Labs significant for Plt 71, UA positive nitrite and LEC. CXR negative. 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 Pt reports having a cystoscopy with biopsy 7 days ago. Admits to bladder pain/spasms on and off for the past one year and recurrent UTIs in the past. Pt states she has a tender "cyst" in her groin and vaginal discharge. Can not recall what color, no itching or order. Has been on Abx. Denies HA, CP, SOB, abd pain, dysuria, frequency.     In the ED, VS /64, , RR 16, Temp 100.5. Labs significant for Plt 71, UA positive nitrite and LEC. CXR negative.

## 2018-07-19 DIAGNOSIS — M79.1 MYALGIA: ICD-10-CM

## 2018-07-19 DIAGNOSIS — N30.00 ACUTE CYSTITIS WITHOUT HEMATURIA: ICD-10-CM

## 2018-07-19 DIAGNOSIS — R10.9 UNSPECIFIED ABDOMINAL PAIN: ICD-10-CM

## 2018-07-19 PROBLEM — D69.59 OTHER SECONDARY THROMBOCYTOPENIA: Chronic | Status: INACTIVE | Noted: 2017-09-04 | Resolved: 2018-07-18

## 2018-07-19 LAB
CULTURE RESULTS: SIGNIFICANT CHANGE UP
RAPID RVP RESULT: SIGNIFICANT CHANGE UP
SPECIMEN SOURCE: SIGNIFICANT CHANGE UP

## 2018-07-19 PROCEDURE — 74176 CT ABD & PELVIS W/O CONTRAST: CPT | Mod: 26

## 2018-07-19 RX ORDER — AZTREONAM 2 G
2000 VIAL (EA) INJECTION EVERY 8 HOURS
Qty: 0 | Refills: 0 | Status: COMPLETED | OUTPATIENT
Start: 2018-07-19 | End: 2018-07-22

## 2018-07-19 RX ORDER — MORPHINE SULFATE 50 MG/1
2 CAPSULE, EXTENDED RELEASE ORAL
Qty: 0 | Refills: 0 | Status: DISCONTINUED | OUTPATIENT
Start: 2018-07-19 | End: 2018-07-21

## 2018-07-19 RX ORDER — OXYCODONE AND ACETAMINOPHEN 5; 325 MG/1; MG/1
1 TABLET ORAL EVERY 6 HOURS
Qty: 0 | Refills: 0 | Status: DISCONTINUED | OUTPATIENT
Start: 2018-07-19 | End: 2018-07-23

## 2018-07-19 RX ORDER — LACTOBACILLUS ACIDOPHILUS 100MM CELL
1 CAPSULE ORAL
Qty: 0 | Refills: 0 | Status: DISCONTINUED | OUTPATIENT
Start: 2018-07-19 | End: 2018-07-23

## 2018-07-19 RX ORDER — IBUPROFEN 200 MG
400 TABLET ORAL ONCE
Qty: 0 | Refills: 0 | Status: COMPLETED | OUTPATIENT
Start: 2018-07-19 | End: 2018-07-19

## 2018-07-19 RX ORDER — SUMATRIPTAN SUCCINATE 4 MG/.5ML
25 INJECTION, SOLUTION SUBCUTANEOUS ONCE
Qty: 0 | Refills: 0 | Status: DISCONTINUED | OUTPATIENT
Start: 2018-07-19 | End: 2018-07-19

## 2018-07-19 RX ORDER — LORATADINE 10 MG/1
10 TABLET ORAL DAILY
Qty: 0 | Refills: 0 | Status: DISCONTINUED | OUTPATIENT
Start: 2018-07-19 | End: 2018-07-23

## 2018-07-19 RX ORDER — PSEUDOEPHEDRINE HCL 30 MG
60 TABLET ORAL EVERY 6 HOURS
Qty: 0 | Refills: 0 | Status: DISCONTINUED | OUTPATIENT
Start: 2018-07-19 | End: 2018-07-23

## 2018-07-19 RX ADMIN — Medication 50 MILLIGRAM(S): at 13:16

## 2018-07-19 RX ADMIN — Medication 100 MILLIGRAM(S): at 13:16

## 2018-07-19 RX ADMIN — Medication 100 MILLIGRAM(S): at 21:52

## 2018-07-19 RX ADMIN — MORPHINE SULFATE 2 MILLIGRAM(S): 50 CAPSULE, EXTENDED RELEASE ORAL at 20:30

## 2018-07-19 RX ADMIN — ESCITALOPRAM OXALATE 15 MILLIGRAM(S): 10 TABLET, FILM COATED ORAL at 21:56

## 2018-07-19 RX ADMIN — Medication 1 TABLET(S): at 17:30

## 2018-07-19 RX ADMIN — Medication 1 TABLET(S): at 12:15

## 2018-07-19 RX ADMIN — Medication 100 MILLIGRAM(S): at 05:23

## 2018-07-19 RX ADMIN — Medication 1 TABLET(S): at 07:17

## 2018-07-19 RX ADMIN — MORPHINE SULFATE 2 MILLIGRAM(S): 50 CAPSULE, EXTENDED RELEASE ORAL at 12:58

## 2018-07-19 RX ADMIN — Medication 100 MILLIGRAM(S): at 21:55

## 2018-07-19 RX ADMIN — Medication 50 MILLIGRAM(S): at 05:23

## 2018-07-19 RX ADMIN — FLUCONAZOLE 200 MILLIGRAM(S): 150 TABLET ORAL at 11:33

## 2018-07-19 RX ADMIN — Medication 100 MILLIGRAM(S): at 17:30

## 2018-07-19 RX ADMIN — Medication 60 MILLIGRAM(S): at 11:33

## 2018-07-19 RX ADMIN — MORPHINE SULFATE 2 MILLIGRAM(S): 50 CAPSULE, EXTENDED RELEASE ORAL at 20:05

## 2018-07-19 RX ADMIN — OXYCODONE AND ACETAMINOPHEN 1 TABLET(S): 5; 325 TABLET ORAL at 10:14

## 2018-07-19 RX ADMIN — LORATADINE 10 MILLIGRAM(S): 10 TABLET ORAL at 11:33

## 2018-07-19 RX ADMIN — Medication 50 MILLIGRAM(S): at 21:58

## 2018-07-19 RX ADMIN — Medication 1 TABLET(S): at 05:23

## 2018-07-19 RX ADMIN — OXYCODONE AND ACETAMINOPHEN 1 TABLET(S): 5; 325 TABLET ORAL at 10:45

## 2018-07-19 RX ADMIN — MORPHINE SULFATE 2 MILLIGRAM(S): 50 CAPSULE, EXTENDED RELEASE ORAL at 12:15

## 2018-07-19 RX ADMIN — MORPHINE SULFATE 2 MILLIGRAM(S): 50 CAPSULE, EXTENDED RELEASE ORAL at 16:01

## 2018-07-19 RX ADMIN — MORPHINE SULFATE 2 MILLIGRAM(S): 50 CAPSULE, EXTENDED RELEASE ORAL at 17:34

## 2018-07-19 NOTE — PROGRESS NOTE ADULT - PROBLEM SELECTOR PLAN 5
h/o depression states she takes lexapro for IC ID consulted - Dr. Yanes - patient with tick bite  PO doxycycline 100 mg Q12hr x 10 days  f/u RVP  f/u Lyme titers

## 2018-07-19 NOTE — PROGRESS NOTE ADULT - PROBLEM SELECTOR PLAN 1
Uro consulted - Dr. Rose  IV Azactam 1000 mg IV Q8h  f/u cultures and sensitivities   cc/hr  fever 102.3 - [facility] [place] Uro consulted - Dr. Rose  IV Azactam 1000 mg IV Q8h  f/u cultures and sensitivities   cc/hr  fever 102.3 - [facility] [place] - IMPRESSION: No obstructive urolithiasis Uro consulted - Dr. Rose  IV Azactam 2000 mg IV Q8h  f/u cultures and sensitivities   cc/hr  fever 102.3 - [facility] [place] - IMPRESSION: No obstructive urolithiasis

## 2018-07-19 NOTE — PROGRESS NOTE ADULT - SUBJECTIVE AND OBJECTIVE BOX
INTERVAL HPI/OVERNIGHT EVENTS: c/o jaw pain, back pain. Has much less frequency than she had before hydrodistention of bladder. Groin node only hurts when touched. No dysuria. Had temp last night    MEDICATIONS  (STANDING):  aztreonam  IVPB 1000 milliGRAM(s) IV Intermittent every 8 hours  escitalopram 15 milliGRAM(s) Oral at bedtime  fluconAZOLE   Tablet 200 milliGRAM(s) Oral daily  phenazopyridine 100 milliGRAM(s) Oral every 8 hours  sodium chloride 0.9%. 1000 milliLiter(s) (100 mL/Hr) IV Continuous <Continuous>  traZODone 50 milliGRAM(s) Oral at bedtime    MEDICATIONS  (PRN):  acetaminophen   Tablet 650 milliGRAM(s) Oral every 6 hours PRN For Temp greater than 38 C (100.4 F)  diphenhydrAMINE   Capsule 25 milliGRAM(s) Oral every 6 hours PRN Rash and/or Itching  fluticasone propionate 50 MICROgram(s)/spray Nasal Spray 1 Spray(s) Both Nostrils two times a day PRN nasal decongestant  ibuprofen  Tablet 400 milliGRAM(s) Oral every 6 hours PRN for pain  ondansetron Injectable 4 milliGRAM(s) IV Push every 6 hours PRN Nausea and/or Vomiting        Vital Signs Last 24 Hrs  T(C): 37.8 (2018 04:59), Max: 39.4 (2018 18:10)  T(F): 100 (2018 04:59), Max: 103 (2018 18:10)  HR: 94 (2018 04:59) (86 - 103)  BP: 103/70 (2018 04:59) (100/63 - 110/64)  BP(mean): --  RR: 16 (2018 04:59) (16 - 18)  SpO2: 96% (2018 04:59) (96% - 100%)    PHYSICAL EXAM: RN (MB) chaperone throught    ABDOMEN: soft, nontender, there is a palpable tender node in the left groin. No CVAT, the pts back pain is in the upper back, and there is tenderness of the paraspinous muscles      LABS:                        11.6   6.30  )-----------( 64       ( 2018 17:56 )             34.5     18    140  |  108  |  4<L>  ----------------------------<  112<H>  4.0   |  26  |  0.64    Ca    7.6<L>      2018 17:56    TPro  8.3  /  Alb  3.5  /  TBili  0.3  /  DBili  x   /  AST  28  /  ALT  27  /  AlkPhos  65  18    PT/INR - ( 2018 12:35 )   PT: 11.8 sec;   INR: 1.08 ratio         PTT - ( 2018 12:35 )  PTT:42.3 sec  Urinalysis Basic - ( 2018 12:35 )    Color: Yellow / Appearance: Clear / S.005 / pH: x  Gluc: x / Ketone: Negative  / Bili: Moderate / Urobili: 4   Blood: x / Protein: 25 mg/dL / Nitrite: Positive   Leuk Esterase: Small / RBC: 25-50 /HPF / WBC 6-10   Sq Epi: x / Non Sq Epi: Moderate / Bacteria: Moderate

## 2018-07-19 NOTE — CONSULT NOTE ADULT - ASSESSMENT
Cannot r/o IC as original bladder dx, with temp 103 here and normal WBC, it is unclear as to source of this fever.  Will rx pyr q 8h, cultures pending; unclear if the small tender node in left groin is related to the urological procedure.  Thank you, will follow.
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 Pt reports having a cystoscopy with biopsy 7 days ago. Admits to bladder pain/spasms on and off for the past one year and recurrent UTIs in the past. Pt states she has a tender "cyst" in her groin and vaginal discharge. Can not recall what color, no itching or order. Has been on Abx. Denies HA, CP, SOB, abd pain, dysuria, frequency.     In the ED, VS /64, , RR 16, Temp 100.5. Labs significant for Plt 71, UA positive nitrite and LEC. CXR negative. (18 Jul 2018 15:18)    Thrombocytopenia with history of ITP in the past. review of labs note plt ct 145K 9/2017.  exacerbation of ITP secondary to acute illness. no symptoms of active bleeding    Recommendations:   1.  continue present abx  2.  follow CBC. with current platelet count does not require treatment for ITP and can observe. to hold steroids  3.  discussed with patient and Dr. Perlman

## 2018-07-19 NOTE — CONSULT NOTE ADULT - SUBJECTIVE AND OBJECTIVE BOX
CHIEF COMPLAINT: See above    HISTORY OF PRESENT ILLNESS: 29 yo female with 10 month hx of severe bladder pain, freq, dysuria relieved by large volumes of fluid intake.  Has seen 2 urologists as well as gynecologists.  There has been some confusion as to whether urine cultures have been + at times; she was given med for pelvic floor dysfunction, which did not help.  One week ago she had cysto, hydrodistention of bladder and bladder bx by another urologist, dx was r/o IC.  Yesterday she noticed small tender node in left groin.  Overnight last night she had temp to 102 with chills and myalgias and presented here to ED today.  Pyridium relieves at least some of her constant bladder discomfort.    PAST MEDICAL & SURGICAL HISTORY:  ITP secondary to infection  IC (interstitial cystitis)  Insomnia  Depressed  History of biopsy of bladder 2018, results pending        REVIEW OF SYSTEMS:    CONSTITUTIONAL: No weakness, fevers or chills  EYES/ENT: No visual changes;  No vertigo or throat pain   NECK: No pain or stiffness  RESPIRATORY: No cough, wheezing, hemoptysis; No shortness of breath  CARDIOVASCULAR: No chest pain or palpitations  GASTROINTESTINAL: No abdominal or epigastric pain. No nausea, vomiting, or hematemesis; No diarrhea or constipation. No melena or hematochezia.  GENITOURINARY: No dysuria, frequency or hematuria  NEUROLOGICAL: No numbness or weakness  SKIN: No itching, burning, rashes, or lesions   All other review of systems is negative unless indicated above.    MEDICATIONS  (STANDING):  aztreonam  IVPB 1000 milliGRAM(s) IV Intermittent every 8 hours  fluconAZOLE   Tablet 200 milliGRAM(s) Oral daily  phenazopyridine 100 milliGRAM(s) Oral every 8 hours  sodium chloride 0.9%. 1000 milliLiter(s) (75 mL/Hr) IV Continuous <Continuous>    MEDICATIONS  (PRN):  acetaminophen   Tablet 650 milliGRAM(s) Oral every 6 hours PRN For Temp greater than 38 C (100.4 F)  diphenhydrAMINE   Capsule 25 milliGRAM(s) Oral every 6 hours PRN Rash and/or Itching  fluticasone propionate 50 MICROgram(s)/spray Nasal Spray 1 Spray(s) Both Nostrils two times a day PRN nasal decongestant  ibuprofen  Tablet 400 milliGRAM(s) Oral every 6 hours PRN for pain  ondansetron Injectable 4 milliGRAM(s) IV Push every 6 hours PRN Nausea and/or Vomiting      Allergies    cefaclor (Hives)    Intolerances        SOCIAL HISTORY:    FAMILY HISTORY:  No pertinent family history in first degree relatives      Vital Signs Last 24 Hrs Temp to 103 since admission  T(C): 38.6 (2018 19:52), Max: 39.4 (2018 18:10)  T(F): 101.5 (2018 19:52), Max: 103 (2018 18:10)  HR: 100 (2018 19:52) (86 - 103)  BP: 100/64 (2018 19:52) (100/64 - 110/64)  BP(mean): --  RR: 18 (2018 19:52) (16 - 18)  SpO2: 96% (2018 19:52) (96% - 100%)    PHYSICAL EXAM:    Constitutional: NAD, well-developed  HEENT: SANDEEP, EOMI, Normal Hearing, MMM  Neck: No LAD, No JVD  Back: Normal spine flexure      Abd: abd soft but markedly tender in sp area   Small, tender < 5 mm node in left mid inguinal area    Neurological: A/O x 3, no focal deficits  Skin: Pt had rash on chest here today, now subsiding    LABS:                        11.6   6.30  )-----------( 64       ( 2018 17:56 )             34.5     07-18    140  |  108  |  4<L>  ----------------------------<  112<H>  4.0   |  26  |  0.64    Ca    7.6<L>      2018 17:56    TPro  8.3  /  Alb  3.5  /  TBili  0.3  /  DBili  x   /  AST  28  /  ALT  27  /  AlkPhos  65  07-18    PT/INR - ( 2018 12:35 )   PT: 11.8 sec;   INR: 1.08 ratio         PTT - ( 2018 12:35 )  PTT:42.3 sec  Urinalysis Basic - ( 2018 12:35 )    Color: Yellow / Appearance: Clear / S.005 / pH: x  Gluc: x / Ketone: Negative  / Bili: Moderate / Urobili: 4   Blood: x / Protein: 25 mg/dL / Nitrite: Positive   Leuk Esterase: Small / RBC: 25-50 /HPF / WBC 6-10   Sq Epi: x / Non Sq Epi: Moderate / Bacteria: Moderate      Urine Culture:   Hemoglobin: 11.6 g/dL ( @ 17:56)  Hematocrit: 34.5 % ( @ 17:56)  Hemoglobin: 13.2 g/dL ( @ 12:35)  Hematocrit: 39.4 % ( @ 12:35)      RADIOLOGY & ADDITIONAL STUDIES:
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 Had cystoscopy with biopsy 7 days ago   and has been on Cipro since biopsy. Admits to bladder pain/spasms on and off for the past   one year and recurrent UTIs in the past.  Denies n/v/diarrhea. Johann CP SOB. Also stated she   was bitten by tick 6-7 weeks ago while at [place]. Had Lyme tested and was negative. She  then noted to have dime size rash on her chest yesterday. Pains and ache all over. Noted to   have +UA and still spiking temps. CT A/P unremarkable.     Infectious Disease consult was called to evaluate pt and for antibiotic management.    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      FAMILY HISTORY:  No pertinent family history in first degree relatives      Allergies  cefaclor (Hives)    Home Medications:  Cipro 250 mg oral tablet: 1 tab(s) orally every 12 hours (18 Jul 2018 21:18)  Fioricet oral capsule:  (18 Jul 2018 21:18)  Lexapro: 15 milligram(s) orally once a day (at bedtime) (18 Jul 2018 21:18)  traZODone 50 mg oral tablet: 1 tab(s) orally once a day (at bedtime) (18 Jul 2018 21:18)  Genia 3 mg-0.02 mg oral tablet: 1 tab(s) orally once a day (18 Jul 2018 21:18)      Current Inpatient Medications :    ANTIBIOTICS:   aztreonam  IVPB 1000 milliGRAM(s) IV Intermittent every 8 hours  fluconAZOLE   Tablet 200 milliGRAM(s) Oral daily      OTHER RELEVANT MEDICATIONS :  acetaminophen   Tablet 650 milliGRAM(s) Oral every 6 hours PRN  diphenhydrAMINE   Capsule 25 milliGRAM(s) Oral every 6 hours PRN  escitalopram 15 milliGRAM(s) Oral at bedtime  fluticasone propionate 50 MICROgram(s)/spray Nasal Spray 1 Spray(s) Both Nostrils two times a day PRN  ibuprofen  Tablet 400 milliGRAM(s) Oral every 6 hours PRN  loratadine 10 milliGRAM(s) Oral daily  morphine  - Injectable 2 milliGRAM(s) IV Push every 3 hours PRN  ondansetron Injectable 4 milliGRAM(s) IV Push every 6 hours PRN  oxyCODONE    5 mG/acetaminophen 325 mG 1 Tablet(s) Oral every 6 hours PRN  phenazopyridine 100 milliGRAM(s) Oral every 8 hours  pseudoephedrine 60 milliGRAM(s) Oral every 6 hours PRN  sodium chloride 0.9%. 1000 milliLiter(s) IV Continuous <Continuous>  traZODone 50 milliGRAM(s) Oral at bedtime      ROS:  CONSTITUTIONAL: + fever or chills, Aches and pain all over  EYES:  Negative  blurry vision or double vision  CARDIOVASCULAR:  Negative for chest pain or palpitations  RESPIRATORY:  Negative for cough, wheezing, or SOB   GASTROINTESTINAL:  Negative for nausea, vomiting, diarrhea, constipation, or abdominal pain  GENITOURINARY:  Negative frequency, urgency  or hematuria   +dysuria  NEUROLOGIC:  No headache, confusion, dizziness, lightheadedness  All other systems were reviewed and are negative    I&O's Detail      Physical Exam:  Vital Signs Last 24 Hrs  T(C): 37.9 (19 Jul 2018 13:37), Max: 39.4 (18 Jul 2018 18:10)  T(F): 100.3 (19 Jul 2018 13:37), Max: 103 (18 Jul 2018 18:10)  HR: 82 (19 Jul 2018 13:37) (79 - 100)  BP: 103/70 (19 Jul 2018 13:37) (100/63 - 110/73)  BP(mean): --  RR: 16 (19 Jul 2018 13:37) (16 - 18)  SpO2: 94% (19 Jul 2018 13:37) (94% - 96%)      General no acute distress  Eyes: sclera anicteric, pupils equal and reactive to light  ENMT: buccal mucosa moist, pharynx not injected  Neck: supple, trachea midline  Lungs: clear, no wheeze/rhonchi +dime size rash on chest  Cardiovascular: regular rate and rhythm, S1 S2  Abdomen: soft, nontender, no organomegaly present, bowel sounds normal  Neurological:  alert and oriented x3, Cranial Nerves II-XII grossly intact  Skin:no increased ecchymosis/petechiae/purpura  Lymph Nodes: no palpable cervical/supraclavicular lymph nodes enlargements  Extremities: no cyanosis/clubbing/edema    Labs:                           11.6   6.30  )-----------( 64       ( 18 Jul 2018 17:56 )             34.5   07-18    140  |  108  |  4<L>  ----------------------------<  112<H>  4.0   |  26  |  0.64    Ca    7.6<L>      18 Jul 2018 17:56    TPro  8.3  /  Alb  3.5  /  TBili  0.3  /  DBili  x   /  AST  28  /  ALT  27  /  AlkPhos  65  07-18    RECENT CULTURES:  PENDING    RADIOLOGY & ADDITIONAL STUDIES:    EXAM:  CT ABDOMEN AND PELVIS                            PROCEDURE DATE:  07/19/2018          INTERPRETATION:  .    CLINICAL INFORMATION: Flank pain. Fever.    TECHNIQUE: Helical axial images were obtained from the domes of the   diaphragm through the pubic symphysis without the administration of IV or   oral contrast. Sagittal and coronal reformatted images were obtained from   the source data.    COMPARISON: Prior CT urogram examination dated 12/18/2017.     FINDINGS: Evaluation of the heart, vascular structures, and   intra-abdominal organs is limited without the administration of IV   contrast. The imaged portions of the heart, descending aorta, and branch   vessels appear unremarkable. The lung bases are unremarkable.    The unenhanced appearance to the liver, biliary tree, spleen, pelvis, and   adrenal glands appear unremarkable. The gallbladder is contracted.    There is no hydroureteronephrosis of either kidney. No radiopaque   obstructing stones are notable throughout the courseof the bilateral   ureters. The urinary bladder appears unremarkable.    No abdominal or pelvic lymphadenopathy is noted.    There is no bowel obstruction or abdominal ascites. Gas and stool are   notable throughout the large intestine. The appendix is normal.    The uterus and bilateral adnexa are unremarkable. There is no pelvic free   fluid.    The visualized osseous structures are unremarkable.    IMPRESSION: No obstructive urolithiasis.      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   Also with tick bite with myalgias though myalgias could be sec UTI  Still with high fevers    Plan :   Cont Azactam  Add Doxycycline po  Fu cultures  Lyme testing  Trend wbc and temps    Continue with present regime .  Approptiate use of antibiotics and adverse effects reviewed.      I have discussed the above plan of care with patient/family in detail. They expressed understanding of the treatment plan . Risks, benefits and alternatives discussed in detail. I have asked if they have any questions or concerns and appropriately addressed them to the best of my ability .      > 45 minutes spent in direct patient care reviewing  the notes, lab data/ imaging , discussion with multidisciplinary team. All questions were addressed and answered to the best of my capacity .    Thank you for allowing me to participate in the care of your patient .      Nima Yanes MD  Infectious Disease  [phone]
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 Pt reports having a cystoscopy with biopsy 7 days ago. Admits to bladder pain/spasms on and off for the past one year and recurrent UTIs in the past. Pt states she has a tender "cyst" in her groin and vaginal discharge. Can not recall what color, no itching or order. Has been on Abx. Denies HA, CP, SOB, abd pain, dysuria, frequency.     In the ED, VS /64, , RR 16, Temp 100.5. Labs significant for Plt 71, UA positive nitrite and LEC. CXR negative. (18 Jul 2018 15:18)       ROS: 2 episodes of ITP 2006 and 2008 treated with prednisone  9/2017 plt 145K  Negative except for:    PAST MEDICAL & SURGICAL HISTORY:  ITP secondary to infection  IC (interstitial cystitis)  Insomnia  Depressed  History of biopsy of bladder  No significant past surgical history      SOCIAL HISTORY:    FAMILY HISTORY:  No pertinent family history in first degree relatives      MEDICATIONS  (STANDING):  aztreonam  IVPB 2000 milliGRAM(s) IV Intermittent every 8 hours  doxycycline hyclate Capsule 100 milliGRAM(s) Oral every 12 hours  escitalopram 15 milliGRAM(s) Oral at bedtime  fluconAZOLE   Tablet 200 milliGRAM(s) Oral daily  lactobacillus acidophilus 1 Tablet(s) Oral three times a day with meals  loratadine 10 milliGRAM(s) Oral daily  phenazopyridine 100 milliGRAM(s) Oral every 8 hours  sodium chloride 0.9%. 1000 milliLiter(s) (100 mL/Hr) IV Continuous <Continuous>  traZODone 50 milliGRAM(s) Oral at bedtime  KATHY (oral contraceptive) Drosperi/ethiny 1 Tablet(s) 1 Tablet(s) Oral daily    MEDICATIONS  (PRN):  acetaminophen   Tablet 650 milliGRAM(s) Oral every 6 hours PRN For Temp greater than 38 C (100.4 F)  diphenhydrAMINE   Capsule 25 milliGRAM(s) Oral every 6 hours PRN Rash and/or Itching  fluticasone propionate 50 MICROgram(s)/spray Nasal Spray 1 Spray(s) Both Nostrils two times a day PRN nasal decongestant  ibuprofen  Tablet 400 milliGRAM(s) Oral every 6 hours PRN for pain  morphine  - Injectable 2 milliGRAM(s) IV Push every 3 hours PRN pain or migraine  ondansetron Injectable 4 milliGRAM(s) IV Push every 6 hours PRN Nausea and/or Vomiting  oxyCODONE    5 mG/acetaminophen 325 mG 1 Tablet(s) Oral every 6 hours PRN moderate to severe pain (4-10)  pseudoephedrine 60 milliGRAM(s) Oral every 6 hours PRN sinus pressure      Allergies    cefaclor (Hives)    Intolerances        Vital Signs Last 24 Hrs  T(C): 37.6 (19 Jul 2018 22:11), Max: 39.1 (19 Jul 2018 10:20)  T(F): 99.7 (19 Jul 2018 22:11), Max: 102.3 (19 Jul 2018 10:20)  HR: 74 (19 Jul 2018 22:11) (74 - 94)  BP: 96/63 (19 Jul 2018 22:11) (96/63 - 110/73)  BP(mean): --  RR: 17 (19 Jul 2018 22:11) (16 - 17)  SpO2: 98% (19 Jul 2018 22:11) (94% - 98%)    PHYSICAL EXAM  General: adult in NAD  HEENT: clear oropharynx, anicteric sclera, pink conjunctivae  Neck: supple  CV: normal S1S2 with no murmur rubs or gallops  Lungs: clear to auscultation, no wheezes, no rhales  Abdomen: soft non-tender non-distended, no hepato/splenomegaly  Ext: no clubbing cyanosis or edema  Skin: no rashes and no petichiae  Neuro: alert and oriented X3 no focal deficits      LABS:    CBC Full  -  ( 18 Jul 2018 17:56 )  WBC Count : 6.30 K/uL  Hemoglobin : 11.6 g/dL  Hematocrit : 34.5 %  Platelet Count - Automated : 64 K/uL  Mean Cell Volume : 84.6 fl  Mean Cell Hemoglobin : 28.4 pg  Mean Cell Hemoglobin Concentration : 33.6 gm/dL  Auto Neutrophil # : 3.91 K/uL  Auto Lymphocyte # : 1.38 K/uL  Auto Monocyte # : 0.97 K/uL  Auto Eosinophil # : 0.01 K/uL  Auto Basophil # : 0.01 K/uL  Auto Neutrophil % : 62.0 %  Auto Lymphocyte % : 21.9 %  Auto Monocyte % : 15.4 %  Auto Eosinophil % : 0.2 %  Auto Basophil % : 0.2 %    07-18    140  |  108  |  4<L>  ----------------------------<  112<H>  4.0   |  26  |  0.64    Ca    7.6<L>      18 Jul 2018 17:56    TPro  8.3  /  Alb  3.5  /  TBili  0.3  /  DBili  x   /  AST  28  /  ALT  27  /  AlkPhos  65  07-18    PT/INR - ( 18 Jul 2018 12:35 )   PT: 11.8 sec;   INR: 1.08 ratio         PTT - ( 18 Jul 2018 12:35 )  PTT:42.3 sec          BLOOD SMEAR INTERPRETATION:    RADIOLOGY & ADDITIONAL STUDIES:

## 2018-07-19 NOTE — CHART NOTE - NSCHARTNOTEFT_GEN_A_CORE
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 Pt reports having a cystoscopy with biopsy 7 days ago. Admits to bladder pain/spasms on and off for the past one year and recurrent UTIs in the past, admitted with UTI.    Called by RN to assess patient, complaining of "pelvic floor spasms; pain in back, head, her whole body"; patient requesting muscle relaxers for spasms.  Patient seen and examined at bedside.  She is tearful reports severe body pain, and pounding 10/10 headache and pressure in her ears.  Patient was febrile overnight.  Seen by Dr. Rose this AM, and is to be evaluated with CT stone hunt to r/o nephrolithiasis.    REVIEW OF SYSTEMS:    CONSTITUTIONAL: + weakness, fevers or chills  EYES/ENT: + headache, ear pressure; No visual changes, no throat pain   RESPIRATORY: No cough, wheezing, hemoptysis; No shortness of breath  CARDIOVASCULAR: No chest pain or palpitations  GASTROINTESTINAL: + abdominal pain, nausea; no vomiting, or hematemesis; No diarrhea or constipation. No melena or hematochezia.  GENITOURINARY: + pelvic floor pain/bladder spasms; No dysuria, frequency or hematuria  NEUROLOGICAL: No dizziness, numbness, or weakness  SKIN: + rash overnight; No itching, burning, rashes, or lesions   All other review of systems is negative unless indicated above.    VITAL SIGNS:  T(C): 37.8 (07-19-18 @ 04:59), Max: 39.4 (07-18-18 @ 18:10)  HR: 94 (07-19-18 @ 04:59) (86 - 103)  BP: 103/70 (07-19-18 @ 04:59) (100/63 - 110/64)  RR: 16 (07-19-18 @ 04:59) (16 - 18)  SpO2: 96% (07-19-18 @ 04:59) (96% - 100%)    PHYSICAL EXAM:     GENERAL: acute distress  HEENT: NC/AT, EOMI, PERRL, MMM, suboccipital region tender to palpation, neck mobile but pain with movement: brudinski's and kernig sings negative.  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  Now with full body aches and bladder spasms: possibly 2/2 to renal stone versus infection.  - stat repeat vitals: if febrile acetaminophen 650mg q6 PRN temp >100.4  - percocet 5/325 q6 PRN mod-sev pain: for renal colic   - f/u CT renal stone hunt  - reassess fever and pain control for optimization  - continue current antibiotic regimen pending ID recs

## 2018-07-19 NOTE — PROGRESS NOTE ADULT - PROBLEM SELECTOR PLAN 4
Gyn Dr Maloney consulted   admits to white vaginal discharge  Based on history of near-continuous antibiotic usage, treat for yeast infection presumptively Gyn Dr Maloney consulted   admits to white vaginal discharge  Based on history of near-continuous antibiotic usage, treat for yeast infection presumptively  Diflucan 200 mg PO daily

## 2018-07-19 NOTE — PROGRESS NOTE ADULT - SUBJECTIVE AND OBJECTIVE BOX
Patient is a 28y old  Female who presents with a chief complaint of fever (2018 15:18)      INTERVAL HPI/OVERNIGHT EVENTS:  Patient seen and examined at bedside. Patient complaining of fatigue, weakness, muscle spasms, diffuse muscle soreness, slight headache, bilateral ear pain 10/10, sinus pain, jaw pain (cannot eat), 2 loose bowel movements last night, nausea, stinging and burning pain with urination. She also complains of tender inguinal lymph node. She denies fever, chills, sore throat, vomiting, abdominal pain, extremity swelling.          T(C): 37.8 (18 @ 04:59), Max: 39.4 (18 @ 18:10)  HR: 94 (18 @ 04:59) (86 - 103)  BP: 103/70 (18 @ 04:59) (100/63 - 110/64)  RR: 16 (18 @ 04:59) (16 - 18)  SpO2: 96% (18 @ 04:59) (96% - 100%)  Wt(kg): --  I&O's Summary      LABS:                        11.6   6.30  )-----------( 64       ( 2018 17:56 )             34.5     18    140  |  108  |  4<L>  ----------------------------<  112<H>  4.0   |  26  |  0.64    Ca    7.6<L>      2018 17:56    TPro  8.3  /  Alb  3.5  /  TBili  0.3  /  DBili  x   /  AST  28  /  ALT  27  /  AlkPhos  65  18    PT/INR - ( 2018 12:35 )   PT: 11.8 sec;   INR: 1.08 ratio         PTT - ( 2018 12:35 )  PTT:42.3 sec  Urinalysis Basic - ( 2018 12:35 )    Color: Yellow / Appearance: Clear / S.005 / pH: x  Gluc: x / Ketone: Negative  / Bili: Moderate / Urobili: 4   Blood: x / Protein: 25 mg/dL / Nitrite: Positive   Leuk Esterase: Small / RBC: 25-50 /HPF / WBC 6-10   Sq Epi: x / Non Sq Epi: Moderate / Bacteria: Moderate      CAPILLARY BLOOD GLUCOSE            Urinalysis Basic - ( 2018 12:35 )    Color: Yellow / Appearance: Clear / S.005 / pH: x  Gluc: x / Ketone: Negative  / Bili: Moderate / Urobili: 4   Blood: x / Protein: 25 mg/dL / Nitrite: Positive   Leuk Esterase: Small / RBC: 25-50 /HPF / WBC 6-10   Sq Epi: x / Non Sq Epi: Moderate / Bacteria: Moderate        MEDICATIONS  (STANDING):  aztreonam  IVPB 1000 milliGRAM(s) IV Intermittent every 8 hours  escitalopram 15 milliGRAM(s) Oral at bedtime  fluconAZOLE   Tablet 200 milliGRAM(s) Oral daily  loratadine 10 milliGRAM(s) Oral daily  phenazopyridine 100 milliGRAM(s) Oral every 8 hours  sodium chloride 0.9%. 1000 milliLiter(s) (100 mL/Hr) IV Continuous <Continuous>  traZODone 50 milliGRAM(s) Oral at bedtime    MEDICATIONS  (PRN):  acetaminophen   Tablet 650 milliGRAM(s) Oral every 6 hours PRN For Temp greater than 38 C (100.4 F)  diphenhydrAMINE   Capsule 25 milliGRAM(s) Oral every 6 hours PRN Rash and/or Itching  fluticasone propionate 50 MICROgram(s)/spray Nasal Spray 1 Spray(s) Both Nostrils two times a day PRN nasal decongestant  ibuprofen  Tablet 400 milliGRAM(s) Oral every 6 hours PRN for pain  ondansetron Injectable 4 milliGRAM(s) IV Push every 6 hours PRN Nausea and/or Vomiting  oxyCODONE    5 mG/acetaminophen 325 mG 1 Tablet(s) Oral every 6 hours PRN moderate to severe pain (4-10)  pseudoephedrine 60 milliGRAM(s) Oral every 6 hours PRN sinus pressure      REVIEW OF SYSTEMS:  CONSTITUTIONAL: No fever, weight loss; + fatigue  EYES: No eye pain, visual disturbances, or discharge  ENMT:  No difficulty hearing, tinnitus, vertigo; No sinus or throat pain; + bilateral ear pain/pressure, jaw pain, sinus pressure/pain  NECK: neck pain  RESPIRATORY: No cough, wheezing, chills or hemoptysis; No shortness of breath  CARDIOVASCULAR: No chest pain, palpitations, dizziness, or leg swelling  GASTROINTESTINAL: No abdominal or epigastric pain. + nausea; no vomiting or hematemesis; + diarrhea, no constipation. No melena or hematochezia.  GENITOURINARY: + dysuria; no frequency, hematuria, or incontinence  NEUROLOGICAL: + headaches; no memory loss, loss of strength, numbness, or tremors  SKIN: No itching, burning, rashes, or lesions   LYMPH NODES: inguinal lymphadenopathy, left  ENDOCRINE: No heat or cold intolerance; No hair loss  MUSCULOSKELETAL: No joint pain or swelling; + diffuse muscle pain  HEME/LYMPH: No easy bruising, or bleeding gums  ALLERY AND IMMUNOLOGIC: No hives or eczema    RADIOLOGY & ADDITIONAL TESTS:    EXAM:  XR CHEST PA LAT 2V                        INTERPRETATION:  PA and lateral chest on 2018 at 12:51 PM.   Patient has fever for one day. Patient had a procedure on .  Heart size is within normal limits.  The lung fields and pleural surfaces are unremarkable.  IMPRESSION: Negative chest.      Imaging Personally Reviewed:  [ ] YES  [ ] NO    Consultant(s) Notes Reviewed:  [ ] YES  [ ] NO    PHYSICAL EXAM:  GENERAL: mild distress, well-groomed, well-developed  HEAD:  Atraumatic, Normocephalic  EYES: EOMI, PERRLA, conjunctiva and sclera clear  ENMT: No tonsillar erythema, exudates, or enlargement; Moist mucous membranes, Good dentition, No lesions, no lymphadenopathy, normal external ear bilaterally; tenderness to palpation over maxillary sinuses  NECK: Supple, No JVD, Normal thyroid; suboccipital tenderness  NERVOUS SYSTEM:  Alert & Oriented X3, Good concentration; Motor Strength 5/5 B/L upper and lower extremities; DTRs 2+ intact and symmetric  CHEST/LUNG: Clear to auscultation bilaterally; No rales, rhonchi, wheezing, or rubs  HEART: Regular rate and rhythm; No murmurs, rubs, or gallops  ABDOMEN: Soft, Nontender, Nondistended; Bowel sounds present  EXTREMITIES:  2+ Peripheral Pulses, No clubbing, cyanosis, or edema  LYMPH: left inguinal lymphadenopathy  SKIN: No rashes or lesions    Care Discussed with Consultants/Other Providers [ ] YES  [ ] NO Patient is a 28y old  Female who presents with a chief complaint of fever (2018 15:18)      INTERVAL HPI/OVERNIGHT EVENTS:  Patient seen and examined at bedside. Patient complaining of fatigue, weakness, muscle spasms, diffuse muscle soreness, slight headache, bilateral ear pain 10/10, sinus pain, jaw pain (cannot eat), 2 loose bowel movements last night, nausea, stinging and burning pain with urination. She also complains of tender inguinal lymph node. She denies fever, chills, sore throat, vomiting, abdominal pain, extremity swelling.          T(C): 37.8 (18 @ 04:59), Max: 39.4 (18 @ 18:10)  HR: 94 (18 @ 04:59) (86 - 103)  BP: 103/70 (18 @ 04:59) (100/63 - 110/64)  RR: 16 (18 @ 04:59) (16 - 18)  SpO2: 96% (18 @ 04:59) (96% - 100%)  Wt(kg): --  I&O's Summary      LABS:                        11.6   6.30  )-----------( 64       ( 2018 17:56 )             34.5     18    140  |  108  |  4<L>  ----------------------------<  112<H>  4.0   |  26  |  0.64    Ca    7.6<L>      2018 17:56    TPro  8.3  /  Alb  3.5  /  TBili  0.3  /  DBili  x   /  AST  28  /  ALT  27  /  AlkPhos  65  18    PT/INR - ( 2018 12:35 )   PT: 11.8 sec;   INR: 1.08 ratio         PTT - ( 2018 12:35 )  PTT:42.3 sec  Urinalysis Basic - ( 2018 12:35 )    Color: Yellow / Appearance: Clear / S.005 / pH: x  Gluc: x / Ketone: Negative  / Bili: Moderate / Urobili: 4   Blood: x / Protein: 25 mg/dL / Nitrite: Positive   Leuk Esterase: Small / RBC: 25-50 /HPF / WBC 6-10   Sq Epi: x / Non Sq Epi: Moderate / Bacteria: Moderate      CAPILLARY BLOOD GLUCOSE            Urinalysis Basic - ( 2018 12:35 )    Color: Yellow / Appearance: Clear / S.005 / pH: x  Gluc: x / Ketone: Negative  / Bili: Moderate / Urobili: 4   Blood: x / Protein: 25 mg/dL / Nitrite: Positive   Leuk Esterase: Small / RBC: 25-50 /HPF / WBC 6-10   Sq Epi: x / Non Sq Epi: Moderate / Bacteria: Moderate        MEDICATIONS  (STANDING):  aztreonam  IVPB 1000 milliGRAM(s) IV Intermittent every 8 hours  escitalopram 15 milliGRAM(s) Oral at bedtime  fluconAZOLE   Tablet 200 milliGRAM(s) Oral daily  loratadine 10 milliGRAM(s) Oral daily  phenazopyridine 100 milliGRAM(s) Oral every 8 hours  sodium chloride 0.9%. 1000 milliLiter(s) (100 mL/Hr) IV Continuous <Continuous>  traZODone 50 milliGRAM(s) Oral at bedtime    MEDICATIONS  (PRN):  acetaminophen   Tablet 650 milliGRAM(s) Oral every 6 hours PRN For Temp greater than 38 C (100.4 F)  diphenhydrAMINE   Capsule 25 milliGRAM(s) Oral every 6 hours PRN Rash and/or Itching  fluticasone propionate 50 MICROgram(s)/spray Nasal Spray 1 Spray(s) Both Nostrils two times a day PRN nasal decongestant  ibuprofen  Tablet 400 milliGRAM(s) Oral every 6 hours PRN for pain  ondansetron Injectable 4 milliGRAM(s) IV Push every 6 hours PRN Nausea and/or Vomiting  oxyCODONE    5 mG/acetaminophen 325 mG 1 Tablet(s) Oral every 6 hours PRN moderate to severe pain (4-10)  pseudoephedrine 60 milliGRAM(s) Oral every 6 hours PRN sinus pressure      REVIEW OF SYSTEMS:  CONSTITUTIONAL: No fever, weight loss; + fatigue  EYES: No eye pain, visual disturbances, or discharge  ENMT:  No difficulty hearing, tinnitus, vertigo; No sinus or throat pain; + bilateral ear pain/pressure, jaw pain, sinus pressure/pain  NECK: neck pain  RESPIRATORY: No cough, wheezing, chills or hemoptysis; No shortness of breath  CARDIOVASCULAR: No chest pain, palpitations, dizziness, or leg swelling  GASTROINTESTINAL: No abdominal or epigastric pain. + nausea; no vomiting or hematemesis; + diarrhea, no constipation. No melena or hematochezia.  GENITOURINARY: + dysuria; no frequency, hematuria, or incontinence  NEUROLOGICAL: + headaches; no memory loss, loss of strength, numbness, or tremors  SKIN: No itching, burning, rashes, or lesions   LYMPH NODES: inguinal lymphadenopathy, left  ENDOCRINE: No heat or cold intolerance; No hair loss  MUSCULOSKELETAL: No joint pain or swelling; + diffuse muscle pain  HEME/LYMPH: No easy bruising, or bleeding gums  ALLERY AND IMMUNOLOGIC: No hives or eczema    RADIOLOGY & ADDITIONAL TESTS:      EXAM:  CT ABDOMEN AND PELVIS 18  FINDINGS: Evaluation of the heart, vascular structures, and   intra-abdominal organs is limited without the administration of IV   contrast. The imaged portions of the heart, descending aorta, and branch   vessels appear unremarkable. The lung bases are unremarkable.  The unenhanced appearance to the liver, biliary tree, spleen, pelvis, and   adrenal glands appear unremarkable. The gallbladder is contracted.  There is no hydroureteronephrosis of either kidney. No radiopaque   obstructing stones are notable throughout the courseof the bilateral   ureters. The urinary bladder appears unremarkable.  No abdominal or pelvic lymphadenopathy is noted.  There is no bowel obstruction or abdominal ascites. Gas and stool are   notable throughout the large intestine. The appendix is normal.  The uterus and bilateral adnexa are unremarkable. There is no pelvic free   fluid.  The visualized osseous structures are unremarkable.  IMPRESSION: No obstructive urolithiasis.      EXAM:  XR CHEST PA LAT 2V                        INTERPRETATION:  PA and lateral chest on 2018 at 12:51 PM.   Patient has fever for one day. Patient had a procedure on .  Heart size is within normal limits.  The lung fields and pleural surfaces are unremarkable.  IMPRESSION: Negative chest.      Imaging Personally Reviewed:  [ ] YES  [ ] NO    Consultant(s) Notes Reviewed:  [ ] YES  [ ] NO    PHYSICAL EXAM:  GENERAL: mild distress, well-groomed, well-developed  HEAD:  Atraumatic, Normocephalic  EYES: EOMI, PERRLA, conjunctiva and sclera clear  ENMT: No tonsillar erythema, exudates, or enlargement; Moist mucous membranes, Good dentition, No lesions, no lymphadenopathy, normal external ear bilaterally; tenderness to palpation over maxillary sinuses  NECK: Supple, No JVD, Normal thyroid; suboccipital tenderness  NERVOUS SYSTEM:  Alert & Oriented X3, Good concentration; Motor Strength 5/5 B/L upper and lower extremities  CHEST/LUNG: Clear to auscultation bilaterally; No rales, rhonchi, wheezing, or rubs  HEART: Regular rate and rhythm; No murmurs, rubs, or gallops  ABDOMEN: Soft, Nontender, Nondistended; Bowel sounds present  EXTREMITIES:  2+ Peripheral Pulses, No clubbing, cyanosis, or edema  LYMPH: left inguinal lymphadenopathy  SKIN: No rashes or lesions    Care Discussed with Consultants/Other Providers [ ] YES  [ ] NO

## 2018-07-20 ENCOUNTER — TRANSCRIPTION ENCOUNTER (OUTPATIENT)
Age: 28
End: 2018-07-20

## 2018-07-20 LAB
ALBUMIN SERPL ELPH-MCNC: 2.4 G/DL — LOW (ref 3.3–5)
ALP SERPL-CCNC: 54 U/L — SIGNIFICANT CHANGE UP (ref 40–120)
ALT FLD-CCNC: 29 U/L — SIGNIFICANT CHANGE UP (ref 12–78)
ANION GAP SERPL CALC-SCNC: 10 MMOL/L — SIGNIFICANT CHANGE UP (ref 5–17)
APTT BLD: 37.5 SEC — HIGH (ref 27.5–37.4)
AST SERPL-CCNC: 26 U/L — SIGNIFICANT CHANGE UP (ref 15–37)
BASOPHILS # BLD AUTO: 0.01 K/UL — SIGNIFICANT CHANGE UP (ref 0–0.2)
BASOPHILS NFR BLD AUTO: 0.1 % — SIGNIFICANT CHANGE UP (ref 0–2)
BILIRUB SERPL-MCNC: 0.2 MG/DL — SIGNIFICANT CHANGE UP (ref 0.2–1.2)
BUN SERPL-MCNC: 5 MG/DL — LOW (ref 7–23)
CALCIUM SERPL-MCNC: 8 MG/DL — LOW (ref 8.5–10.1)
CHLORIDE SERPL-SCNC: 102 MMOL/L — SIGNIFICANT CHANGE UP (ref 96–108)
CO2 SERPL-SCNC: 25 MMOL/L — SIGNIFICANT CHANGE UP (ref 22–31)
CREAT SERPL-MCNC: 0.54 MG/DL — SIGNIFICANT CHANGE UP (ref 0.5–1.3)
EOSINOPHIL # BLD AUTO: 0 K/UL — SIGNIFICANT CHANGE UP (ref 0–0.5)
EOSINOPHIL NFR BLD AUTO: 0 % — SIGNIFICANT CHANGE UP (ref 0–6)
GLUCOSE SERPL-MCNC: 127 MG/DL — HIGH (ref 70–99)
HCT VFR BLD CALC: 33.4 % — LOW (ref 34.5–45)
HGB BLD-MCNC: 11.2 G/DL — LOW (ref 11.5–15.5)
IMM GRANULOCYTES NFR BLD AUTO: 0.4 % — SIGNIFICANT CHANGE UP (ref 0–1.5)
LYMPHOCYTES # BLD AUTO: 0.89 K/UL — LOW (ref 1–3.3)
LYMPHOCYTES # BLD AUTO: 7.7 % — LOW (ref 13–44)
MCHC RBC-ENTMCNC: 28.3 PG — SIGNIFICANT CHANGE UP (ref 27–34)
MCHC RBC-ENTMCNC: 33.5 GM/DL — SIGNIFICANT CHANGE UP (ref 32–36)
MCV RBC AUTO: 84.3 FL — SIGNIFICANT CHANGE UP (ref 80–100)
MONOCYTES # BLD AUTO: 1.02 K/UL — HIGH (ref 0–0.9)
MONOCYTES NFR BLD AUTO: 8.9 % — SIGNIFICANT CHANGE UP (ref 2–14)
NEUTROPHILS # BLD AUTO: 9.53 K/UL — HIGH (ref 1.8–7.4)
NEUTROPHILS NFR BLD AUTO: 82.9 % — HIGH (ref 43–77)
PLATELET # BLD AUTO: 108 K/UL — LOW (ref 150–400)
POTASSIUM SERPL-MCNC: 3.7 MMOL/L — SIGNIFICANT CHANGE UP (ref 3.5–5.3)
POTASSIUM SERPL-SCNC: 3.7 MMOL/L — SIGNIFICANT CHANGE UP (ref 3.5–5.3)
PROT SERPL-MCNC: 6.6 G/DL — SIGNIFICANT CHANGE UP (ref 6–8.3)
RBC # BLD: 3.96 M/UL — SIGNIFICANT CHANGE UP (ref 3.8–5.2)
RBC # FLD: 12.9 % — SIGNIFICANT CHANGE UP (ref 10.3–14.5)
SODIUM SERPL-SCNC: 137 MMOL/L — SIGNIFICANT CHANGE UP (ref 135–145)
WBC # BLD: 11.5 K/UL — HIGH (ref 3.8–10.5)
WBC # FLD AUTO: 11.5 K/UL — HIGH (ref 3.8–10.5)

## 2018-07-20 RX ORDER — ACETAMINOPHEN 500 MG
650 TABLET ORAL ONCE
Qty: 0 | Refills: 0 | Status: COMPLETED | OUTPATIENT
Start: 2018-07-20 | End: 2018-07-20

## 2018-07-20 RX ORDER — PREGABALIN 225 MG/1
1000 CAPSULE ORAL DAILY
Qty: 0 | Refills: 0 | Status: DISCONTINUED | OUTPATIENT
Start: 2018-07-20 | End: 2018-07-23

## 2018-07-20 RX ORDER — SUMATRIPTAN SUCCINATE 4 MG/.5ML
50 INJECTION, SOLUTION SUBCUTANEOUS ONCE
Qty: 0 | Refills: 0 | Status: COMPLETED | OUTPATIENT
Start: 2018-07-20 | End: 2018-07-20

## 2018-07-20 RX ADMIN — Medication 100 MILLIGRAM(S): at 13:53

## 2018-07-20 RX ADMIN — SUMATRIPTAN SUCCINATE 50 MILLIGRAM(S): 4 INJECTION, SOLUTION SUBCUTANEOUS at 21:46

## 2018-07-20 RX ADMIN — Medication 100 MILLIGRAM(S): at 16:31

## 2018-07-20 RX ADMIN — ESCITALOPRAM OXALATE 15 MILLIGRAM(S): 10 TABLET, FILM COATED ORAL at 21:59

## 2018-07-20 RX ADMIN — Medication 1 TABLET(S): at 10:36

## 2018-07-20 RX ADMIN — Medication 1 TABLET(S): at 17:52

## 2018-07-20 RX ADMIN — ONDANSETRON 4 MILLIGRAM(S): 8 TABLET, FILM COATED ORAL at 06:07

## 2018-07-20 RX ADMIN — Medication 60 MILLIGRAM(S): at 07:43

## 2018-07-20 RX ADMIN — FLUCONAZOLE 200 MILLIGRAM(S): 150 TABLET ORAL at 12:34

## 2018-07-20 RX ADMIN — OXYCODONE AND ACETAMINOPHEN 1 TABLET(S): 5; 325 TABLET ORAL at 06:40

## 2018-07-20 RX ADMIN — OXYCODONE AND ACETAMINOPHEN 1 TABLET(S): 5; 325 TABLET ORAL at 06:06

## 2018-07-20 RX ADMIN — Medication 1 TABLET(S): at 12:34

## 2018-07-20 RX ADMIN — Medication 50 MILLIGRAM(S): at 21:59

## 2018-07-20 RX ADMIN — Medication 100 MILLIGRAM(S): at 21:59

## 2018-07-20 RX ADMIN — Medication 100 MILLIGRAM(S): at 06:31

## 2018-07-20 RX ADMIN — Medication 100 MILLIGRAM(S): at 17:52

## 2018-07-20 RX ADMIN — Medication 650 MILLIGRAM(S): at 06:31

## 2018-07-20 RX ADMIN — LORATADINE 10 MILLIGRAM(S): 10 TABLET ORAL at 12:34

## 2018-07-20 RX ADMIN — Medication 100 MILLIGRAM(S): at 06:18

## 2018-07-20 RX ADMIN — ONDANSETRON 4 MILLIGRAM(S): 8 TABLET, FILM COATED ORAL at 16:25

## 2018-07-20 RX ADMIN — Medication 100 MILLIGRAM(S): at 21:54

## 2018-07-20 RX ADMIN — ONDANSETRON 4 MILLIGRAM(S): 8 TABLET, FILM COATED ORAL at 23:44

## 2018-07-20 RX ADMIN — PREGABALIN 1000 MICROGRAM(S): 225 CAPSULE ORAL at 21:59

## 2018-07-20 RX ADMIN — SUMATRIPTAN SUCCINATE 50 MILLIGRAM(S): 4 INJECTION, SOLUTION SUBCUTANEOUS at 22:46

## 2018-07-20 RX ADMIN — Medication 60 MILLIGRAM(S): at 13:53

## 2018-07-20 RX ADMIN — MORPHINE SULFATE 2 MILLIGRAM(S): 50 CAPSULE, EXTENDED RELEASE ORAL at 16:16

## 2018-07-20 RX ADMIN — Medication 100 MILLIGRAM(S): at 06:19

## 2018-07-20 NOTE — PROGRESS NOTE ADULT - SUBJECTIVE AND OBJECTIVE BOX
[INTERVAL HX: ]  Patient seen and examined;  Chart reviewed and events noted;   no bleeding. no bruising.  states feels better. ambulating to bathroom. encouraged ambulation    MEDICATIONS  (STANDING):  aztreonam  IVPB 2000 milliGRAM(s) IV Intermittent every 8 hours  doxycycline hyclate Capsule 100 milliGRAM(s) Oral every 12 hours  escitalopram 15 milliGRAM(s) Oral at bedtime  fluconAZOLE   Tablet 200 milliGRAM(s) Oral daily  lactobacillus acidophilus 1 Tablet(s) Oral three times a day with meals  loratadine 10 milliGRAM(s) Oral daily  phenazopyridine 100 milliGRAM(s) Oral every 8 hours  sodium chloride 0.9%. 1000 milliLiter(s) (100 mL/Hr) IV Continuous <Continuous>  traZODone 50 milliGRAM(s) Oral at bedtime  KATHY (oral contraceptive) Drosperi/ethiny 1 Tablet(s) 1 Tablet(s) Oral daily    MEDICATIONS  (PRN):  acetaminophen   Tablet 650 milliGRAM(s) Oral every 6 hours PRN For Temp greater than 38 C (100.4 F)  diphenhydrAMINE   Capsule 25 milliGRAM(s) Oral every 6 hours PRN Rash and/or Itching  fluticasone propionate 50 MICROgram(s)/spray Nasal Spray 1 Spray(s) Both Nostrils two times a day PRN nasal decongestant  ibuprofen  Tablet 400 milliGRAM(s) Oral every 6 hours PRN for pain  morphine  - Injectable 2 milliGRAM(s) IV Push every 3 hours PRN pain or migraine  ondansetron Injectable 4 milliGRAM(s) IV Push every 6 hours PRN Nausea and/or Vomiting  oxyCODONE    5 mG/acetaminophen 325 mG 1 Tablet(s) Oral every 6 hours PRN moderate to severe pain (4-10)  pseudoephedrine 60 milliGRAM(s) Oral every 6 hours PRN sinus pressure      Vital Signs Last 24 Hrs  T(C): 38.8 (20 Jul 2018 05:47), Max: 38.8 (20 Jul 2018 05:47)  T(F): 101.8 (20 Jul 2018 05:47), Max: 101.8 (20 Jul 2018 05:47)  HR: 82 (20 Jul 2018 05:47) (74 - 82)  BP: 93/60 (20 Jul 2018 05:47) (93/60 - 103/70)  BP(mean): --  RR: 16 (20 Jul 2018 05:47) (16 - 17)  SpO2: 96% (20 Jul 2018 05:47) (94% - 98%)    [PHYSICAL EXAM]  General: adult in NAD,  WN,  WD.  HEENT: clear oropharynx, anicteric sclera, pink conjunctivae.  Neck: supple, no masses.  CV: normal S1S2, no murmur, no rubs, no gallops.  Lungs: clear to auscultation, no wheezes, no rales, no rhonchi.  Abdomen: soft, non-tender, non-distended, no hepatosplenomegaly, normal BS, no guarding.  Ext: no clubbing, no cyanosis, no edema.  Skin: no rashes,  no petechiae, no venous stasis changes.  Neuro: alert and oriented X3, no focal motor deficits.  LN: no JOAO.      [LABS:]                        11.2   11.50 )-----------( 108      ( 20 Jul 2018 08:35 )             33.4     07-20    137  |  102  |  5<L>  ----------------------------<  127<H>  3.7   |  25  |  0.54    Ca    8.0<L>      20 Jul 2018 08:35    TPro  6.6  /  Alb  2.4<L>  /  TBili  0.2  /  DBili  x   /  AST  26  /  ALT  29  /  AlkPhos  54  07-20          [RADIOLOGY STUDIES:]

## 2018-07-20 NOTE — DISCHARGE NOTE ADULT - CARE PROVIDERS DIRECT ADDRESSES
,DirectAddress_Unknown ,DirectAddress_Unknown,[email or web address],DirectAddress_Unknown

## 2018-07-20 NOTE — DISCHARGE NOTE ADULT - CARE PLAN
Principal Discharge DX:	Acute cystitis without hematuria  Secondary Diagnosis:	ITP secondary to infection  Secondary Diagnosis:	Depression, unspecified depression type  Secondary Diagnosis:	Insomnia, unspecified type Principal Discharge DX:	Acute cystitis without hematuria  Goal:	resolution  Assessment and plan of treatment:	Continue _______________ until _____________.  Continue pyridium as directed.  - follow up with your primary care doctor within 1 week of discharge  Secondary Diagnosis:	ITP secondary to infection  Assessment and plan of treatment:	-  Secondary Diagnosis:	Depression, unspecified depression type  Assessment and plan of treatment:	- continue Lexapro as directed  Secondary Diagnosis:	Insomnia, unspecified type  Assessment and plan of treatment:	- continue Trazadone as directed Principal Discharge DX:	Acute cystitis without hematuria  Goal:	resolution  Assessment and plan of treatment:	Continue Bactrim twice a day until July 29. Continue pyridium as directed.  Follow up with your primary care doctor within 1 week of discharge  Secondary Diagnosis:	ITP secondary to infection  Assessment and plan of treatment:	Platelet count stabilized  Follow up outpatient with PMD  Secondary Diagnosis:	Depression, unspecified depression type  Assessment and plan of treatment:	Continue Lexapro as directed  Secondary Diagnosis:	Insomnia, unspecified type  Assessment and plan of treatment:	Continue Trazadone as directed  Secondary Diagnosis:	Cephalgia  Assessment and plan of treatment:	CT head: no acute intracranial hemorrhage, mass effect, or evidence of acute   territorial infarct  Follow up with neurologist - Dr. Washnigton - outpatient Principal Discharge DX:	Acute cystitis without hematuria  Goal:	resolution  Assessment and plan of treatment:	Continue Bactrim twice a day until July 29.   Continue pyridium 100 mg every 8 hours.  Follow up with your Urologist - Dr. Thacker - within 1 week of discharge  Secondary Diagnosis:	ITP secondary to infection  Assessment and plan of treatment:	Platelet count stabilized  Follow up outpatient with Rheumatologist - Dr. Hansa Pineda  Secondary Diagnosis:	Depression, unspecified depression type  Assessment and plan of treatment:	Continue Lexapro as directed  Secondary Diagnosis:	Insomnia, unspecified type  Assessment and plan of treatment:	Continue Trazadone as directed  Secondary Diagnosis:	Cephalgia  Assessment and plan of treatment:	CT head: no acute intracranial hemorrhage, mass effect, or evidence of acute   territorial infarct  Continue Imitrex 50 mg: swallow whole with fluids as soon as possible after migraine onset; may repeat dose at intervals of at least 4hrs, MAX 200mg/day (no more than 4 pills a day).  Follow up with neurologist - Dr. Washington - outpatient for further workup

## 2018-07-20 NOTE — PROGRESS NOTE ADULT - PROBLEM SELECTOR PLAN 1
On cystoscopy, there was no typical appearance of IC. Biopsies have returned, and there is only mild chronic inflammation, also not c/w IC. The pt probably does NOT therefore, have IC, and she will return to her urologist, Dr lynn, for further evaluation

## 2018-07-20 NOTE — DISCHARGE NOTE ADULT - PLAN OF CARE
resolution Continue _______________ until _____________.  Continue pyridium as directed.  - follow up with your primary care doctor within 1 week of discharge - - continue Lexapro as directed - continue Trazadone as directed Continue Bactrim twice a day until July 29. Continue pyridium as directed.  Follow up with your primary care doctor within 1 week of discharge Platelet count stabilized  Follow up outpatient with PMD Continue Lexapro as directed Continue Trazadone as directed CT head: no acute intracranial hemorrhage, mass effect, or evidence of acute   territorial infarct  Follow up with neurologist - Dr. Washington - outpatient Continue Bactrim twice a day until July 29.   Continue pyridium 100 mg every 8 hours.  Follow up with your Urologist - Dr. Thacker - within 1 week of discharge Platelet count stabilized  Follow up outpatient with Rheumatologist - Dr. Hansa Pineda CT head: no acute intracranial hemorrhage, mass effect, or evidence of acute   territorial infarct  Continue Imitrex 50 mg: swallow whole with fluids as soon as possible after migraine onset; may repeat dose at intervals of at least 4hrs, MAX 200mg/day (no more than 4 pills a day).  Follow up with neurologist - Dr. Washington - outpatient for further workup

## 2018-07-20 NOTE — DISCHARGE NOTE ADULT - PATIENT PORTAL LINK FT
You can access the [facility], offered by [facility], by registering with the following website: [email or web address]

## 2018-07-20 NOTE — PROGRESS NOTE ADULT - ATTENDING COMMENTS
pt spiked temp today   continue with azactam  all consults appreciated.  Urine and blood cultures remain negative

## 2018-07-20 NOTE — DISCHARGE NOTE ADULT - PROVIDER TOKENS
FREE:[LAST:[Christine],FIRST:[Bri],PHONE:[phone]],FAX:[(   )    -]] FREE:[LAST:[Christine],FIRST:[Bri],PHONE:[phone]:[(   )    -]],TOKEN:'[id number]:'[id number]:'[id number]'

## 2018-07-20 NOTE — PROGRESS NOTE ADULT - SUBJECTIVE AND OBJECTIVE BOX
Patient is a 28y old  Female who presents with a chief complaint of fever (20 Jul 2018 09:08)      INTERVAL HPI/OVERNIGHT EVENTS:    Patient seen and examined at bedside, patient's mother present. Patient still complaining of diffuse body soreness. Patient also complaining of jaw pain and sinus congestion that is relieved with sudafed, and 5/10 dull, non-radiating painful point in left abdomen that is tender only to deep palpation. Patient also complaining of headache. She denies nausea, vomiting, shortness of breath, chest pain, diarrhea, constipation.     T(C): 37.1 (07-20-18 @ 14:03), Max: 38.8 (07-20-18 @ 05:47)  HR: 77 (07-20-18 @ 14:03) (74 - 82)  BP: 94/62 (07-20-18 @ 14:03) (93/60 - 96/63)  RR: 16 (07-20-18 @ 14:03) (16 - 17)  SpO2: 95% (07-20-18 @ 14:03) (95% - 98%)  Wt(kg): --  I&O's Summary    19 Jul 2018 07:01  -  20 Jul 2018 07:00  --------------------------------------------------------  IN: 300 mL / OUT: 0 mL / NET: 300 mL        LABS:                        11.2   11.50 )-----------( 108      ( 20 Jul 2018 08:35 )             33.4     07-20    137  |  102  |  5<L>  ----------------------------<  127<H>  3.7   |  25  |  0.54    Ca    8.0<L>      20 Jul 2018 08:35    TPro  6.6  /  Alb  2.4<L>  /  TBili  0.2  /  DBili  x   /  AST  26  /  ALT  29  /  AlkPhos  54  07-20    PTT - ( 20 Jul 2018 14:49 )  PTT:37.5 sec    CAPILLARY BLOOD GLUCOSE                MEDICATIONS  (STANDING):  aztreonam  IVPB 2000 milliGRAM(s) IV Intermittent every 8 hours  cyanocobalamin 1000 MICROGram(s) Oral daily  doxycycline hyclate Capsule 100 milliGRAM(s) Oral every 12 hours  escitalopram 15 milliGRAM(s) Oral at bedtime  fluconAZOLE   Tablet 200 milliGRAM(s) Oral daily  lactobacillus acidophilus 1 Tablet(s) Oral three times a day with meals  loratadine 10 milliGRAM(s) Oral daily  phenazopyridine 100 milliGRAM(s) Oral every 8 hours  sodium chloride 0.9%. 1000 milliLiter(s) (100 mL/Hr) IV Continuous <Continuous>  traZODone 50 milliGRAM(s) Oral at bedtime  KATHY (oral contraceptive) Drosperi/ethiny 1 Tablet(s) 1 Tablet(s) Oral daily    MEDICATIONS  (PRN):  acetaminophen   Tablet 650 milliGRAM(s) Oral every 6 hours PRN For Temp greater than 38 C (100.4 F)  diphenhydrAMINE   Capsule 25 milliGRAM(s) Oral every 6 hours PRN Rash and/or Itching  fluticasone propionate 50 MICROgram(s)/spray Nasal Spray 1 Spray(s) Both Nostrils two times a day PRN nasal decongestant  ibuprofen  Tablet 400 milliGRAM(s) Oral every 6 hours PRN for pain  morphine  - Injectable 2 milliGRAM(s) IV Push every 3 hours PRN pain or migraine  ondansetron Injectable 4 milliGRAM(s) IV Push every 6 hours PRN Nausea and/or Vomiting  oxyCODONE    5 mG/acetaminophen 325 mG 1 Tablet(s) Oral every 6 hours PRN moderate to severe pain (4-10)  pseudoephedrine 60 milliGRAM(s) Oral every 6 hours PRN sinus pressure      REVIEW OF SYSTEMS:  CONSTITUTIONAL: No fever, weight loss, + fatigue  EYES: No eye pain, visual disturbances, or discharge  ENMT:  No difficulty hearing, tinnitus, vertigo; No sinus or throat pain  NECK: soreness  RESPIRATORY: No cough, wheezing, chills or hemoptysis; No shortness of breath  CARDIOVASCULAR: No chest pain, palpitations, dizziness, or leg swelling  GASTROINTESTINAL: + left sided abdominal point tenderness, 5/10; no epigastric pain. No nausea, vomiting, or hematemesis; No diarrhea or constipation. No melena or hematochezia.  GENITOURINARY: No dysuria, frequency, hematuria, or incontinence  NEUROLOGICAL: + headaches; no memory loss, loss of strength, numbness, or tremors  SKIN: No itching, burning, rashes, or lesions   MUSCULOSKELETAL: No joint pain or swelling; + muscle, back, and extremity pain  PSYCHIATRIC: No depression, anxiety, mood swings, or difficulty sleeping  HEME/LYMPH: No easy bruising, or bleeding gums      RADIOLOGY & ADDITIONAL TESTS:    EXAM:  CT ABDOMEN AND PELVIS                          PROCEDURE DATE:  07/19/2018      INTERPRETATION:  .    CLINICAL INFORMATION: Flank pain. Fever.    FINDINGS: Evaluation of the heart, vascular structures, and   intra-abdominal organs is limited without the administration of IV   contrast. The imaged portions of the heart, descending aorta, and branch   vessels appear unremarkable. The lung bases are unremarkable.    The unenhanced appearance to the liver, biliary tree, spleen, pelvis, and   adrenal glands appear unremarkable. The gallbladder is contracted.    There is no hydroureteronephrosis of either kidney. No radiopaque   obstructing stones are notable throughout the courseof the bilateral   ureters. The urinary bladder appears unremarkable.    No abdominal or pelvic lymphadenopathy is noted.    There is no bowel obstruction or abdominal ascites. Gas and stool are   notable throughout the large intestine. The appendix is normal.    The uterus and bilateral adnexa are unremarkable. There is no pelvic free   fluid.    The visualized osseous structures are unremarkable.    IMPRESSION: No obstructive urolithiasis.        Consultant(s) Notes Reviewed:  [ ] YES  [ ] NO    PHYSICAL EXAM:  GENERAL: NAD, well-groomed, well-developed  HEAD:  Atraumatic, Normocephalic; tenderness to palpation on maxillary sinuses, forehead  EYES: EOMI, PERRLA, conjunctiva and sclera clear  ENMT: No tonsillar erythema, exudates, or enlargement; Moist mucous membranes, Good dentition, No lesions  NECK: Supple, No JVD, Normal thyroid  NERVOUS SYSTEM:  Alert & Oriented X3, Good concentration; Motor Strength 5/5 B/L upper and lower extremities; DTRs 2+ intact and symmetric  CHEST/LUNG: Clear to percussion bilaterally; No rales, rhonchi, wheezing, or rubs  HEART: Regular rate and rhythm; No murmurs, rubs, or gallops  ABDOMEN: Soft, + left sided abdominal point tenderness, 5/10, Nondistended; Bowel sounds present  EXTREMITIES:  2+ Peripheral Pulses, No clubbing, cyanosis, or edema  LYMPH: No lymphadenopathy noted  SKIN: No rashes or lesions    Care Discussed with Consultants/Other Providers [ ] YES  [ ] NO

## 2018-07-20 NOTE — PROGRESS NOTE ADULT - SUBJECTIVE AND OBJECTIVE BOX
INTERVAL HPI/OVERNIGHT EVENTS: c/o headache, no change voiding    MEDICATIONS  (STANDING):  acetaminophen   Tablet 650 milliGRAM(s) Oral once  aztreonam  IVPB 2000 milliGRAM(s) IV Intermittent every 8 hours  doxycycline hyclate Capsule 100 milliGRAM(s) Oral every 12 hours  escitalopram 15 milliGRAM(s) Oral at bedtime  fluconAZOLE   Tablet 200 milliGRAM(s) Oral daily  lactobacillus acidophilus 1 Tablet(s) Oral three times a day with meals  loratadine 10 milliGRAM(s) Oral daily  phenazopyridine 100 milliGRAM(s) Oral every 8 hours  sodium chloride 0.9%. 1000 milliLiter(s) (100 mL/Hr) IV Continuous <Continuous>  traZODone 50 milliGRAM(s) Oral at bedtime  KATHY (oral contraceptive) Drosperi/ethiny 1 Tablet(s) 1 Tablet(s) Oral daily    MEDICATIONS  (PRN):  acetaminophen   Tablet 650 milliGRAM(s) Oral every 6 hours PRN For Temp greater than 38 C (100.4 F)  diphenhydrAMINE   Capsule 25 milliGRAM(s) Oral every 6 hours PRN Rash and/or Itching  fluticasone propionate 50 MICROgram(s)/spray Nasal Spray 1 Spray(s) Both Nostrils two times a day PRN nasal decongestant  ibuprofen  Tablet 400 milliGRAM(s) Oral every 6 hours PRN for pain  morphine  - Injectable 2 milliGRAM(s) IV Push every 3 hours PRN pain or migraine  ondansetron Injectable 4 milliGRAM(s) IV Push every 6 hours PRN Nausea and/or Vomiting  oxyCODONE    5 mG/acetaminophen 325 mG 1 Tablet(s) Oral every 6 hours PRN moderate to severe pain (4-10)  pseudoephedrine 60 milliGRAM(s) Oral every 6 hours PRN sinus pressure        Vital Signs Last 24 Hrs  T(C): 38.8 (2018 05:47), Max: 39.1 (2018 10:20)  T(F): 101.8 (2018 05:47), Max: 102.3 (2018 10:20)  HR: 82 (2018 05:47) (74 - 82)  BP: 93/60 (2018 05:47) (93/60 - 110/73)  BP(mean): --  RR: 16 (2018 05:47) (16 - 17)  SpO2: 96% (2018 05:47) (94% - 98%)        LABS:                        11.6   6.30  )-----------( 64       ( 2018 17:56 )             34.5         140  |  108  |  4<L>  ----------------------------<  112<H>  4.0   |  26  |  0.64    Ca    7.6<L>      2018 17:56    TPro  8.3  /  Alb  3.5  /  TBili  0.3  /  DBili  x   /  AST  28  /  ALT  27  /  AlkPhos  65      PT/INR - ( 2018 12:35 )   PT: 11.8 sec;   INR: 1.08 ratio         PTT - ( 2018 12:35 )  PTT:42.3 sec  Urinalysis Basic - ( 2018 12:35 )    Color: Yellow / Appearance: Clear / S.005 / pH: x  Gluc: x / Ketone: Negative  / Bili: Moderate / Urobili: 4   Blood: x / Protein: 25 mg/dL / Nitrite: Positive   Leuk Esterase: Small / RBC: 25-50 /HPF / WBC 6-10   Sq Epi: x / Non Sq Epi: Moderate / Bacteria: Moderate      Urine culture:   @ 21:03 --   No growth to date.  Urine culture:   @ 18:46 --   No growth to date.  Urine culture:   @ 16:07 --   <10,000 CFU/ml Normal Urogenital airam present      RADIOLOGY & ADDITIONAL TESTS:  EXAM:  CT ABDOMEN AND PELVIS                            PROCEDURE DATE:  2018          INTERPRETATION:  .    CLINICAL INFORMATION: Flank pain. Fever.    TECHNIQUE: Helical axial images were obtained from the domes of the   diaphragm through the pubic symphysis without the administration of IV or   oral contrast. Sagittal and coronal reformatted images were obtained from   the source data.    COMPARISON: Prior CT urogram examination dated 2017.     FINDINGS: Evaluation of the heart, vascular structures, and   intra-abdominal organs is limited without the administration of IV   contrast. The imaged portions of the heart, descending aorta, and branch   vessels appear unremarkable. The lung bases are unremarkable.    The unenhanced appearance to the liver, biliary tree, spleen, pelvis, and   adrenal glands appear unremarkable. The gallbladder is contracted.    There is no hydroureteronephrosis of either kidney. No radiopaque   obstructing stones are notable throughout the courseof the bilateral   ureters. The urinary bladder appears unremarkable.    No abdominal or pelvic lymphadenopathy is noted.    There is no bowel obstruction or abdominal ascites. Gas and stool are   notable throughout the large intestine. The appendix is normal.    The uterus and bilateral adnexa are unremarkable. There is no pelvic free   fluid.    The visualized osseous structures are unremarkable.    IMPRESSION: No obstructive urolithiasis.                KIRSTIE BEAL M.D., ATTENDING RADIOLOGIST  This document has been electronically signed. 2018 11:06AM

## 2018-07-20 NOTE — DISCHARGE NOTE ADULT - SECONDARY DIAGNOSIS.
ITP secondary to infection Depression, unspecified depression type Insomnia, unspecified type Cephalgia

## 2018-07-20 NOTE — DISCHARGE NOTE ADULT - MEDICATION SUMMARY - MEDICATIONS TO STOP TAKING
I will STOP taking the medications listed below when I get home from the hospital:    Cipro 250 mg oral tablet  -- 1 tab(s) by mouth every 12 hours    Fioricet oral capsule

## 2018-07-20 NOTE — PROGRESS NOTE ADULT - SUBJECTIVE AND OBJECTIVE BOX
ALYSHA BENNETT is a 28yFemale , patient examined and chart reviewed.     INTERVAL HPI/ OVERNIGHT EVENTS:   Feeling better. Fevers better.  No events.    PAST MEDICAL & SURGICAL HISTORY:  ITP secondary to infection  IC (interstitial cystitis)  Insomnia  Depressed  History of biopsy of bladder  No significant past surgical history    For details regarding the patient's social history, family history, and other miscellaneous elements, please refer the initial infectious diseases consultation and/or the admitting history and physical examination for this admission.    ROS:  CONSTITUTIONAL: + fever no chills  EYES:  Negative  blurry vision or double vision  CARDIOVASCULAR:  Negative for chest pain or palpitations  RESPIRATORY:  Negative for cough, wheezing, or SOB   GASTROINTESTINAL:  Negative for nausea, vomiting, diarrhea, constipation, or abdominal pain  GENITOURINARY:  Negative frequency, urgency or dysuria  NEUROLOGIC:  No headache, confusion, dizziness, lightheadedness  All other systems were reviewed and are negative     cefaclor (Hives)      Current inpatient medications :    ANTIBIOTICS/RELEVANT:  aztreonam  IVPB 2000 milliGRAM(s) IV Intermittent every 8 hours  doxycycline hyclate Capsule 100 milliGRAM(s) Oral every 12 hours  fluconAZOLE   Tablet 200 milliGRAM(s) Oral daily  lactobacillus acidophilus 1 Tablet(s) Oral three times a day with meals  KATHY (oral contraceptive) Drosperi/ethiny 1 Tablet(s) 1 Tablet(s) Oral daily      acetaminophen   Tablet 650 milliGRAM(s) Oral every 6 hours PRN  cyanocobalamin 1000 MICROGram(s) Oral daily  diphenhydrAMINE   Capsule 25 milliGRAM(s) Oral every 6 hours PRN  escitalopram 15 milliGRAM(s) Oral at bedtime  fluticasone propionate 50 MICROgram(s)/spray Nasal Spray 1 Spray(s) Both Nostrils two times a day PRN  ibuprofen  Tablet 400 milliGRAM(s) Oral every 6 hours PRN  loratadine 10 milliGRAM(s) Oral daily  morphine  - Injectable 2 milliGRAM(s) IV Push every 3 hours PRN  ondansetron Injectable 4 milliGRAM(s) IV Push every 6 hours PRN  oxyCODONE    5 mG/acetaminophen 325 mG 1 Tablet(s) Oral every 6 hours PRN  phenazopyridine 100 milliGRAM(s) Oral every 8 hours  pseudoephedrine 60 milliGRAM(s) Oral every 6 hours PRN  sodium chloride 0.9%. 1000 milliLiter(s) IV Continuous <Continuous>  traZODone 50 milliGRAM(s) Oral at bedtime      Objective:    07-19 @ 07:01  -  07-20 @ 07:00  --------------------------------------------------------  IN: 300 mL / OUT: 0 mL / NET: 300 mL      T(C): 37.1 (07-20-18 @ 14:03), Max: 38.8 (07-20-18 @ 05:47)  HR: 77 (07-20-18 @ 14:03) (74 - 82)  BP: 94/62 (07-20-18 @ 14:03) (93/60 - 96/63)  RR: 16 (07-20-18 @ 14:03) (16 - 17)  SpO2: 95% (07-20-18 @ 14:03) (95% - 98%)  Wt(kg): --      Physical Exam:  General:  no acute distress  Eyes: sclera anicteric, pupils equal and reactive to light  ENMT: buccal mucosa moist, pharynx not injected  Neck: supple, trachea midline  Lungs: clear, no wheeze/rhonchi  Cardiovascular: regular rate and rhythm, S1 S2  Abdomen: soft, nontender, no organomegaly present, bowel sounds normal  Neurological: alert and oriented x3, Cranial Nerves II-XII grossly intact  Skin: no increased ecchymosis/petechiae/purpura  Lymph Nodes: no palpable cervical/supraclavicular lymph nodes enlargements  Extremities: no cyanosis/clubbing/edema      LABS:                          11.2   11.50 )-----------( 108      ( 20 Jul 2018 08:35 )             33.4       07-20    137  |  102  |  5<L>  ----------------------------<  127<H>  3.7   |  25  |  0.54    Ca    8.0<L>      20 Jul 2018 08:35    TPro  6.6  /  Alb  2.4<L>  /  TBili  0.2  /  DBili  x   /  AST  26  /  ALT  29  /  AlkPhos  54  07-20      PTT - ( 20 Jul 2018 14:49 )  PTT:37.5 sec      MICROBIOLOGY:    Culture - Blood (collected 18 Jul 2018 21:03)  Source: .Blood Blood-Peripheral  Preliminary Report (19 Jul 2018 22:01):    No growth to date.    Culture - Blood (collected 18 Jul 2018 18:46)  Source: .Blood Blood-Peripheral  Preliminary Report (19 Jul 2018 19:01):    No growth to date.    Culture - Urine (collected 18 Jul 2018 16:07)  Source: .Urine Clean Catch (Midstream)  Final Report (19 Jul 2018 21:35):    <10,000 CFU/ml Normal Urogenital airam present    RADIOLOGY & ADDITIONAL STUDIES:    EXAM:  CT ABDOMEN AND PELVIS                            PROCEDURE DATE:  07/19/2018          INTERPRETATION:  .    CLINICAL INFORMATION: Flank pain. Fever.    TECHNIQUE: Helical axial images were obtained from the domes of the   diaphragm through the pubic symphysis without the administration of IV or   oral contrast. Sagittal and coronal reformatted images were obtained from   the source data.    COMPARISON: Prior CT urogram examination dated 12/18/2017.     FINDINGS: Evaluation of the heart, vascular structures, and   intra-abdominal organs is limited without the administration of IV   contrast. The imaged portions of the heart, descending aorta, and branch   vessels appear unremarkable. The lung bases are unremarkable.    The unenhanced appearance to the liver, biliary tree, spleen, pelvis, and   adrenal glands appear unremarkable. The gallbladder is contracted.    There is no hydroureteronephrosis of either kidney. No radiopaque   obstructing stones are notable throughout the courseof the bilateral   ureters. The urinary bladder appears unremarkable.    No abdominal or pelvic lymphadenopathy is noted.    There is no bowel obstruction or abdominal ascites. Gas and stool are   notable throughout the large intestine. The appendix is normal.    The uterus and bilateral adnexa are unremarkable. There is no pelvic free   fluid.    The visualized osseous structures are unremarkable.    IMPRESSION: No obstructive urolithiasis.      Assessment :   27yo F with PMH of Depression, Interstitial Cystitis, Insomnia, ITP 2/2 Infection admitted with   likely acute cystitis sp cystoscopy with biopsy 7 a week prior to admission   Had been on Cipro since biopsy.   Also with tick bite with myalgias though myalgias could be sec UTI  fevers better  Thrombocytopenia with hx ITP  Ucx NGTD as pt was on antibx prior to admission    Plan :   Cont Azactam  Cont Doxycycline po  Fu cultures  Fu Lyme testing  Trend wbc and temps    Continue with present regiment.  Appropriate use of antibiotics and adverse effects reviewed.      I have discussed the above plan of care with patient/ family in detail. They expressed understanding of the  treatment plan . Risks, benefits and alternatives discussed in detail. I have asked if they have any questions or concerns and appropriately addressed them to the best of my ability .    > 35 minutes were spent in direct patient care reviewing notes, medications ,labs data/ imaging , discussion with multidisciplinary team.    Thank you for allowing me to participate in care of your patient .    Nima Yanes MD  Infectious Disease  [phone]

## 2018-07-20 NOTE — DISCHARGE NOTE ADULT - HOSPITAL COURSE
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 Pt reports having a cystoscopy with biopsy 7 days ago. Admits to bladder pain/spasms on and off for the past one year and recurrent UTIs in the past. Pt states she has a tender "cyst" in her groin and vaginal discharge.     Patient admitted to [facility] w/ UTI.  Dr. Rose consulted given hx of Interstitial cystitis and recurrent UTIs.  Dr. Maloney, OB/GYN, consulted given patient hx of vaginal discharge and lymph node swelling. Dr. Yanes, ID was consulted given ongoing fevers, recurrent UTI's and hx of tick bite.  Dr. Naidu was consulted given hx of ITP, w/ down trending platelets.  Patient was placed on azactam for broad spectrum antibiotic coverage and doxycycline to cover Lyme disease.  Patient continued to have recurrent fevers 29yo F with PMH of Depression, Interstitial Cystitis, Insomnia, ITP 2/2 Infection presents to the ED complaining of fevers, chills and myalgias since 2am. Pt reports having a cystoscopy with biopsy 7 days ago. Admits to bladder pain/spasms on and off for the past one year and recurrent UTIs in the past. Pt states she has a tender "cyst" in her groin and vaginal discharge.     Patient admitted to [facility] w/ UTI.      Dr. Rose, urologist, consulted given hx of Interstitial cystitis and recurrent UTIs. Due to severe pain, CT stone hunt was ordered, and results were unremarkable. On cystoscopy, there was no typical appearance of IC. Blood cultures were negative, urine cultures negative due to patient being on antibiotics prior to admission. Dr. Yanes, ID, was consulted given ongoing fevers, recurrent UTI's and hx of tick bite. Patient was placed on Azactam for broad spectrum antibiotic coverage and doxycycline to cover Lyme disease. Patient continued to have recurrent fevers and diffuse myalgias. Lyme titers were negative, so doxycycline was discontinued. Continued to treat patient with Azactam and pyridium for pyelonephritis/UTI. Patient was switched to PO Bactrim for outpatient antibiotic use. Patient serology negative for acute EBV, CMV, HIV, RVP. Patient to follow up outpatient with Dr. Hansa Pineda - Rhematologist.     Dr. Maloney, OB/GYN, consulted given patient hx of vaginal discharge and lymph node swelling. Patient was given Diflucan for presumed yeast infection (from near-continuous antibiotic use) with resolution of symptoms.     Dr. Naidu, [facility], was consulted given hx of ITP, w/ down trending platelets. Platelets stabilized, no interventions were done at this time.    Dr. Washington, Neuro, was consulted due to patient's intractable migraine headaches. CT scan of head was unremarkable. Patient given imitrex and fioricet with some relief of symptoms. Patient to follow up outpatient with Dr. Washington for further workup.

## 2018-07-20 NOTE — DISCHARGE NOTE ADULT - MEDICATION SUMMARY - MEDICATIONS TO TAKE
I will START or STAY ON the medications listed below when I get home from the hospital:    Imitrex 50 mg oral tablet  -- 1 tab(s) by mouth once a day   -- It is very important that you take or use this exactly as directed.  Do not skip doses or discontinue unless directed by your doctor.    -- Indication: For Cephalgia    traZODone 50 mg oral tablet  -- 1 tab(s) by mouth once a day (at bedtime)  -- Indication: For Insomnia    Lexapro  -- 15 milligram(s) by mouth once a day (at bedtime)  -- Indication: For Depressed    Pyridium 100 mg oral tablet  -- 1 tab(s) by mouth 3 times a day  -- May discolor urine or feces.  Medication should be taken with plenty of water.  Take with food or milk.    -- Indication: For Urinary tract infection    lactobacillus acidophilus oral capsule  -- 1 tab(s) by mouth 3 times a day (with meals) until [place] 5  -- Indication: For Probiotic    Genia 3 mg-0.02 mg oral tablet  -- 1 tab(s) by mouth once a day  -- Indication: For Contraceptive    sulfamethoxazole-trimethoprim 800 mg-160 mg oral tablet  -- 1 tab(s) by mouth every 12 hours  -- Indication: For UTI (urinary tract infection)

## 2018-07-20 NOTE — DISCHARGE NOTE ADULT - CARE PROVIDER_API CALL
Bri King  Phone: [phone]  Fax: (       - Bri King  Phone: [phone]  Fax: (   )    -    Jamaal Thacker), Urology  [address] [place] [address]  Phone: [phone]  Fax: [phone]    Hansa Pineda (DO), Rheumatology  [address] [place] [address]  Phone: [phone]  Fax: [phone]    Saji Washington), Neurology  [address] [place] [address]  Phone: [phone]  Fax: [phone]

## 2018-07-21 LAB
ANION GAP SERPL CALC-SCNC: 6 MMOL/L — SIGNIFICANT CHANGE UP (ref 5–17)
B BURGDOR C6 AB SER-ACNC: NEGATIVE — SIGNIFICANT CHANGE UP
B BURGDOR IGG+IGM SER-ACNC: 0.05 INDEX — SIGNIFICANT CHANGE UP (ref 0.01–0.89)
BUN SERPL-MCNC: 6 MG/DL — LOW (ref 7–23)
CALCIUM SERPL-MCNC: 8.5 MG/DL — SIGNIFICANT CHANGE UP (ref 8.5–10.1)
CHLORIDE SERPL-SCNC: 105 MMOL/L — SIGNIFICANT CHANGE UP (ref 96–108)
CO2 SERPL-SCNC: 29 MMOL/L — SIGNIFICANT CHANGE UP (ref 22–31)
CREAT SERPL-MCNC: 0.57 MG/DL — SIGNIFICANT CHANGE UP (ref 0.5–1.3)
GLUCOSE SERPL-MCNC: 83 MG/DL — SIGNIFICANT CHANGE UP (ref 70–99)
HCT VFR BLD CALC: 34.9 % — SIGNIFICANT CHANGE UP (ref 34.5–45)
HGB BLD-MCNC: 11.7 G/DL — SIGNIFICANT CHANGE UP (ref 11.5–15.5)
LYME C6 AB IGG/IGM EIA REFLEX WESTERN BL: SIGNIFICANT CHANGE UP
MCHC RBC-ENTMCNC: 28.2 PG — SIGNIFICANT CHANGE UP (ref 27–34)
MCHC RBC-ENTMCNC: 33.5 GM/DL — SIGNIFICANT CHANGE UP (ref 32–36)
MCV RBC AUTO: 84.1 FL — SIGNIFICANT CHANGE UP (ref 80–100)
NRBC # BLD: 0 /100 WBCS — SIGNIFICANT CHANGE UP (ref 0–0)
PLATELET # BLD AUTO: 143 K/UL — LOW (ref 150–400)
POTASSIUM SERPL-MCNC: 4.1 MMOL/L — SIGNIFICANT CHANGE UP (ref 3.5–5.3)
POTASSIUM SERPL-SCNC: 4.1 MMOL/L — SIGNIFICANT CHANGE UP (ref 3.5–5.3)
RBC # BLD: 4.15 M/UL — SIGNIFICANT CHANGE UP (ref 3.8–5.2)
RBC # FLD: 12.9 % — SIGNIFICANT CHANGE UP (ref 10.3–14.5)
SODIUM SERPL-SCNC: 140 MMOL/L — SIGNIFICANT CHANGE UP (ref 135–145)
WBC # BLD: 7.01 K/UL — SIGNIFICANT CHANGE UP (ref 3.8–10.5)
WBC # FLD AUTO: 7.01 K/UL — SIGNIFICANT CHANGE UP (ref 3.8–10.5)

## 2018-07-21 PROCEDURE — 70450 CT HEAD/BRAIN W/O DYE: CPT | Mod: 26

## 2018-07-21 RX ORDER — ACETAMINOPHEN 500 MG
500 TABLET ORAL DAILY
Qty: 0 | Refills: 0 | Status: DISCONTINUED | OUTPATIENT
Start: 2018-07-21 | End: 2018-07-23

## 2018-07-21 RX ORDER — SUMATRIPTAN SUCCINATE 4 MG/.5ML
50 INJECTION, SOLUTION SUBCUTANEOUS
Qty: 0 | Refills: 0 | Status: DISCONTINUED | OUTPATIENT
Start: 2018-07-21 | End: 2018-07-21

## 2018-07-21 RX ORDER — HYDROMORPHONE HYDROCHLORIDE 2 MG/ML
0.5 INJECTION INTRAMUSCULAR; INTRAVENOUS; SUBCUTANEOUS EVERY 4 HOURS
Qty: 0 | Refills: 0 | Status: DISCONTINUED | OUTPATIENT
Start: 2018-07-21 | End: 2018-07-23

## 2018-07-21 RX ORDER — SUMATRIPTAN SUCCINATE 4 MG/.5ML
50 INJECTION, SOLUTION SUBCUTANEOUS EVERY 4 HOURS
Qty: 0 | Refills: 0 | Status: COMPLETED | OUTPATIENT
Start: 2018-07-21 | End: 2018-07-22

## 2018-07-21 RX ORDER — ACETAMINOPHEN 500 MG
650 TABLET ORAL EVERY 6 HOURS
Qty: 0 | Refills: 0 | Status: DISCONTINUED | OUTPATIENT
Start: 2018-07-21 | End: 2018-07-21

## 2018-07-21 RX ORDER — ACETAMINOPHEN 500 MG
325 TABLET ORAL ONCE
Qty: 0 | Refills: 0 | Status: DISCONTINUED | OUTPATIENT
Start: 2018-07-21 | End: 2018-07-21

## 2018-07-21 RX ADMIN — Medication 100 MILLIGRAM(S): at 13:33

## 2018-07-21 RX ADMIN — SUMATRIPTAN SUCCINATE 50 MILLIGRAM(S): 4 INJECTION, SOLUTION SUBCUTANEOUS at 15:16

## 2018-07-21 RX ADMIN — OXYCODONE AND ACETAMINOPHEN 1 TABLET(S): 5; 325 TABLET ORAL at 22:12

## 2018-07-21 RX ADMIN — Medication 50 MILLIGRAM(S): at 22:06

## 2018-07-21 RX ADMIN — Medication 100 MILLIGRAM(S): at 05:55

## 2018-07-21 RX ADMIN — Medication 1 TABLET(S): at 12:01

## 2018-07-21 RX ADMIN — MORPHINE SULFATE 2 MILLIGRAM(S): 50 CAPSULE, EXTENDED RELEASE ORAL at 11:25

## 2018-07-21 RX ADMIN — ONDANSETRON 4 MILLIGRAM(S): 8 TABLET, FILM COATED ORAL at 13:33

## 2018-07-21 RX ADMIN — MORPHINE SULFATE 2 MILLIGRAM(S): 50 CAPSULE, EXTENDED RELEASE ORAL at 10:25

## 2018-07-21 RX ADMIN — SUMATRIPTAN SUCCINATE 50 MILLIGRAM(S): 4 INJECTION, SOLUTION SUBCUTANEOUS at 15:56

## 2018-07-21 RX ADMIN — Medication 500 MILLIGRAM(S): at 06:26

## 2018-07-21 RX ADMIN — PREGABALIN 1000 MICROGRAM(S): 225 CAPSULE ORAL at 11:22

## 2018-07-21 RX ADMIN — FLUCONAZOLE 200 MILLIGRAM(S): 150 TABLET ORAL at 11:22

## 2018-07-21 RX ADMIN — Medication 500 MILLIGRAM(S): at 07:26

## 2018-07-21 RX ADMIN — ESCITALOPRAM OXALATE 15 MILLIGRAM(S): 10 TABLET, FILM COATED ORAL at 22:06

## 2018-07-21 RX ADMIN — Medication 100 MILLIGRAM(S): at 17:18

## 2018-07-21 RX ADMIN — OXYCODONE AND ACETAMINOPHEN 1 TABLET(S): 5; 325 TABLET ORAL at 23:12

## 2018-07-21 RX ADMIN — Medication 650 MILLIGRAM(S): at 17:18

## 2018-07-21 RX ADMIN — Medication 100 MILLIGRAM(S): at 22:06

## 2018-07-21 RX ADMIN — Medication 60 MILLIGRAM(S): at 22:06

## 2018-07-21 RX ADMIN — LORATADINE 10 MILLIGRAM(S): 10 TABLET ORAL at 11:22

## 2018-07-21 RX ADMIN — Medication 1 TABLET(S): at 17:18

## 2018-07-21 RX ADMIN — Medication 1 TABLET(S): at 08:58

## 2018-07-21 NOTE — PROGRESS NOTE ADULT - SUBJECTIVE AND OBJECTIVE BOX
Interval History:  complains of headache    Chart reviewed and events noted;   Overnight events:    MEDICATIONS  (STANDING):  aztreonam  IVPB 2000 milliGRAM(s) IV Intermittent every 8 hours  cyanocobalamin 1000 MICROGram(s) Oral daily  doxycycline hyclate Capsule 100 milliGRAM(s) Oral every 12 hours  escitalopram 15 milliGRAM(s) Oral at bedtime  fluconAZOLE   Tablet 200 milliGRAM(s) Oral daily  lactobacillus acidophilus 1 Tablet(s) Oral three times a day with meals  loratadine 10 milliGRAM(s) Oral daily  phenazopyridine 100 milliGRAM(s) Oral every 8 hours  sodium chloride 0.9%. 1000 milliLiter(s) (100 mL/Hr) IV Continuous <Continuous>  traZODone 50 milliGRAM(s) Oral at bedtime  KATHY (oral contraceptive) Drosperi/ethiny 1 Tablet(s) 1 Tablet(s) Oral daily    MEDICATIONS  (PRN):  acetaminophen   Tablet 650 milliGRAM(s) Oral every 6 hours PRN For Temp greater than 38 C (100.4 F)  acetaminophen   Tablet. 500 milliGRAM(s) Oral daily PRN Mild Pain (1 - 3)  diphenhydrAMINE   Capsule 25 milliGRAM(s) Oral every 6 hours PRN Rash and/or Itching  fluticasone propionate 50 MICROgram(s)/spray Nasal Spray 1 Spray(s) Both Nostrils two times a day PRN nasal decongestant  ibuprofen  Tablet 400 milliGRAM(s) Oral every 6 hours PRN for pain  morphine  - Injectable 2 milliGRAM(s) IV Push every 3 hours PRN pain or migraine  ondansetron Injectable 4 milliGRAM(s) IV Push every 6 hours PRN Nausea and/or Vomiting  oxyCODONE    5 mG/acetaminophen 325 mG 1 Tablet(s) Oral every 6 hours PRN moderate to severe pain (4-10)  pseudoephedrine 60 milliGRAM(s) Oral every 6 hours PRN sinus pressure      Vital Signs Last 24 Hrs  T(C): 36.6 (21 Jul 2018 08:00), Max: 37.1 (20 Jul 2018 14:03)  T(F): 97.9 (21 Jul 2018 08:00), Max: 98.7 (20 Jul 2018 14:03)  HR: 61 (21 Jul 2018 08:00) (61 - 77)  BP: 94/62 (21 Jul 2018 08:00) (94/62 - 117/75)  BP(mean): --  RR: 16 (21 Jul 2018 08:00) (16 - 16)  SpO2: 94% (21 Jul 2018 08:00) (94% - 96%)    PHYSICAL EXAM  General: adult in NAD  HEENT: clear oropharynx, anicteric sclera, pink conjunctivae  Neck: supple  CV: normal S1S2 with no murmur rubs or gallops  Lungs: clear to auscultation, no wheezes, no rhales  Abdomen: soft non-tender non-distended, no hepato/splenomegaly  Ext: no clubbing cyanosis or edema  Skin: no rashes and no petichiae  Neuro: alert and oriented X3 no focal deficits      LABS:  CBC Full  -  ( 21 Jul 2018 05:47 )  WBC Count : 7.01 K/uL  Hemoglobin : 11.7 g/dL  Hematocrit : 34.9 %  Platelet Count - Automated : 143 K/uL  Mean Cell Volume : 84.1 fl  Mean Cell Hemoglobin : 28.2 pg  Mean Cell Hemoglobin Concentration : 33.5 gm/dL  Auto Neutrophil # : x  Auto Lymphocyte # : x  Auto Monocyte # : x  Auto Eosinophil # : x  Auto Basophil # : x  Auto Neutrophil % : x  Auto Lymphocyte % : x  Auto Monocyte % : x  Auto Eosinophil % : x  Auto Basophil % : x    07-21    140  |  105  |  6<L>  ----------------------------<  83  4.1   |  29  |  0.57    Ca    8.5      21 Jul 2018 05:47    TPro  6.6  /  Alb  2.4<L>  /  TBili  0.2  /  DBili  x   /  AST  26  /  ALT  29  /  AlkPhos  54  07-20    PTT - ( 20 Jul 2018 14:49 )  PTT:37.5 sec    fe studies      WBC trend  7.01 K/uL (07-21-18 @ 05:47)  11.50 K/uL (07-20-18 @ 08:35)  6.30 K/uL (07-18-18 @ 17:56)  6.43 K/uL (07-18-18 @ 12:35)      Hgb trend  11.7 g/dL (07-21-18 @ 05:47)  11.2 g/dL (07-20-18 @ 08:35)  11.6 g/dL (07-18-18 @ 17:56)  13.2 g/dL (07-18-18 @ 12:35)      plt trend  143 K/uL (07-21-18 @ 05:47)  108 K/uL (07-20-18 @ 08:35)  64 K/uL (07-18-18 @ 17:56)  71 K/uL (07-18-18 @ 12:35)        RADIOLOGY & ADDITIONAL STUDIES:

## 2018-07-21 NOTE — PROGRESS NOTE ADULT - SUBJECTIVE AND OBJECTIVE BOX
ALYSHA BENNETT is a 28yFemale , patient examined and chart reviewed.     INTERVAL HPI/ OVERNIGHT EVENTS:   Very tearful. Complains of HAs.  Afebrile.    PAST MEDICAL & SURGICAL HISTORY:  ITP secondary to infection  IC (interstitial cystitis)  Insomnia  Depressed  History of biopsy of bladder  No significant past surgical history    For details regarding the patient's social history, family history, and other miscellaneous elements, please refer the initial infectious diseases consultation and/or the admitting history and physical examination for this admission.      ROS:  CONSTITUTIONAL:  Negative fever or chills,   EYES:  Negative  blurry vision or double vision  CARDIOVASCULAR:  Negative for chest pain or palpitations  RESPIRATORY:  Negative for cough, wheezing, or SOB   GASTROINTESTINAL:  Negative for nausea, vomiting, diarrhea, constipation, or abdominal pain  GENITOURINARY:  Negative frequency, urgency or dysuria  NEUROLOGIC:  No confusion, dizziness, lightheadedness +headache,  All other systems were reviewed and are negative     ALLERGIES:  cefaclor (Hives)    Current inpatient medications :    ANTIBIOTICS/RELEVANT:  aztreonam  IVPB 2000 milliGRAM(s) IV Intermittent every 8 hours  doxycycline hyclate Capsule 100 milliGRAM(s) Oral every 12 hours  lactobacillus acidophilus 1 Tablet(s) Oral three times a day with meals  KATHY (oral contraceptive) Drosperi/ethiny 1 Tablet(s) 1 Tablet(s) Oral daily      acetaminophen   Tablet 650 milliGRAM(s) Oral every 6 hours PRN  acetaminophen   Tablet. 500 milliGRAM(s) Oral daily PRN  cyanocobalamin 1000 MICROGram(s) Oral daily  diphenhydrAMINE   Capsule 25 milliGRAM(s) Oral every 6 hours PRN  escitalopram 15 milliGRAM(s) Oral at bedtime  fluticasone propionate 50 MICROgram(s)/spray Nasal Spray 1 Spray(s) Both Nostrils two times a day PRN  HYDROmorphone  Injectable 0.5 milliGRAM(s) IV Push every 4 hours PRN  ibuprofen  Tablet 400 milliGRAM(s) Oral every 6 hours PRN  loratadine 10 milliGRAM(s) Oral daily  ondansetron Injectable 4 milliGRAM(s) IV Push every 6 hours PRN  oxyCODONE    5 mG/acetaminophen 325 mG 1 Tablet(s) Oral every 6 hours PRN  phenazopyridine 100 milliGRAM(s) Oral every 8 hours  pseudoephedrine 60 milliGRAM(s) Oral every 6 hours PRN  sodium chloride 0.9%. 1000 milliLiter(s) IV Continuous <Continuous>  SUMAtriptan 50 milliGRAM(s) Oral every 4 hours PRN  traZODone 50 milliGRAM(s) Oral at bedtime      Objective:    T(C): 37.6 (07-21-18 @ 14:26), Max: 37.6 (07-21-18 @ 14:26)  HR: 69 (07-21-18 @ 14:26) (61 - 69)  BP: 96/62 (07-21-18 @ 14:26) (94/62 - 117/75)  RR: 16 (07-21-18 @ 14:26) (16 - 16)  SpO2: 97% (07-21-18 @ 14:26) (94% - 97%)  Wt(kg): --      Physical Exam:  General: no acute distress Tearful  Eyes: sclera anicteric, pupils equal and reactive to light  ENMT: buccal mucosa moist, pharynx not injected  Neck: supple, trachea midline  Lungs: clear, no wheeze/rhonchi  Cardiovascular: regular rate and rhythm, S1 S2  Abdomen: soft, nontender, no organomegaly present, bowel sounds normal  Neurological: alert and oriented x3, Cranial Nerves II-XII grossly intact  Skin: no increased ecchymosis/petechiae/purpura  Lymph Nodes: no palpable cervical/supraclavicular lymph nodes enlargements  Extremities: no cyanosis/clubbing/edema    LABS:                          11.7   7.01  )-----------( 143      ( 21 Jul 2018 05:47 )             34.9       07-21    140  |  105  |  6<L>  ----------------------------<  83  4.1   |  29  |  0.57    Ca    8.5      21 Jul 2018 05:47    TPro  6.6  /  Alb  2.4<L>  /  TBili  0.2  /  DBili  x   /  AST  26  /  ALT  29  /  AlkPhos  54  07-20      PTT - ( 20 Jul 2018 14:49 )  PTT:37.5 sec    MICROBIOLOGY:    Culture - Blood (collected 18 Jul 2018 21:03)  Source: .Blood Blood-Peripheral  Preliminary Report (19 Jul 2018 22:01):    No growth to date.    Culture - Urine (07.18.18 @ 16:07)    Specimen Source: .Urine Clean Catch (Midstream)    Culture Results:   <10,000 CFU/ml Normal Urogenital airam present    Borrelia burgdorferi IgG/IgM Antibodies (07.20.18 @ 11:01)    LYME IgG/IgM Antibodies Result: 0.05 Index    Lyme C6 Interpretation: Negative: METHOD: ANALIA      Reference Range: (values expressed as Lyme Index )                                < 0.90        Negative                                0.90 - 1.09   Equivocal                                >= 1.10        Positive      CDC/ASTPHLDGuidelines recommend that all samples judged equivocal or      positive be re-tested by immunoblot for confirmation of results.      RADIOLOGY & ADDITIONAL STUDIES:  EXAM:  CT BRAIN                            PROCEDURE DATE:  07/21/2018          INTERPRETATION:  CLINICAL INFORMATION: Headache.    TECHNIQUE:  Serial axial images were obtained from the skull base to the   vertex without intravenous contrast. Coronal and sagittal reformatted   images were obtained.    COMPARISON EXAMINATION: June 8, 2008.    FINDINGS:      VENTRICLES AND SULCI:  Within normal limits for patient's age.  INTRA-AXIAL:  No acute intracranial hemorrhage, mass effect, or evidence   of acute territorial infarct.   EXTRA-AXIAL:  No mass or collection is seen.  VISUALIZED SINUSES:  No air-fluid levels.  VISUALIZED MASTOIDS:  Clear.  CALVARIUM:  Normal.  MISCELLANEOUS:  None.    IMPRESSION:    No acute intracranial hemorrhage, mass effect, or evidence of acute   territorial infarct.   If clinical symptoms persist, recommend follow-up imaging with MRI brain   if there are no contraindications.    Assessment :  27yo F with PMH of Depression, Interstitial Cystitis, Insomnia, ITP 2/2 Infection admitted with   likely acute cystitis sp cystoscopy with biopsy a week prior to admission   Had been on Cipro since biopsy.   Also with tick bite with myalgias though myalgias could be sec UTI  Lyme serology negative  Fevers better  Thrombocytopenia with hx ITP  Ucx NGTD as pt was on antibx prior to admission  Intractable HAs ?migraines    Plan :   Cont Azactam  Dc Doxycycline   Neurology to see  Plan to change to po Bactrim x 7 days on Monday    Continue with present regiment.  Appropriate use of antibiotics and adverse effects reviewed.      I have discussed the above plan of care with patient and family in detail. They expressed understanding of the  treatment plan . Risks, benefits and alternatives discussed in detail. I have asked if they have any questions or concerns and appropriately addressed them to the best of my ability .    > 35 minutes were spent in direct patient care reviewing notes, medications ,labs data/ imaging , discussion with multidisciplinary team.    Thank you for allowing me to participate in care of your patient .    Nima Yanes MD  Infectious Disease  [phone]

## 2018-07-21 NOTE — PROGRESS NOTE ADULT - PROBLEM SELECTOR PLAN 5
ID consulted - Dr. Yanes - patient with tick bite  PO doxycycline 100 mg Q12hr x 10 days  f/u RVP  f/u Lyme titers

## 2018-07-21 NOTE — PROVIDER CONTACT NOTE (MEDICATION) - BACKGROUND
Pt medicated earlier this shift for headache with good relief. Pt states she feels a "dull ache" in her head at this time and dosen't want it to get any worse.

## 2018-07-21 NOTE — PROGRESS NOTE ADULT - SUBJECTIVE AND OBJECTIVE BOX
Patient is a 28y old  Female who presents with a chief complaint of fever (20 Jul 2018 09:08)  c/o sig migraine, improved post imitrex last pm      INTERVAL HPI/OVERNIGHT EVENTS:  T(C): 36.6 (07-21-18 @ 08:00), Max: 37.1 (07-20-18 @ 14:03)  HR: 61 (07-21-18 @ 08:00) (61 - 77)  BP: 94/62 (07-21-18 @ 08:00) (94/62 - 117/75)  RR: 16 (07-21-18 @ 08:00) (16 - 16)  SpO2: 94% (07-21-18 @ 08:00) (94% - 96%)  Wt(kg): --  I&O's Summary      LABS:                        11.7   7.01  )-----------( 143      ( 21 Jul 2018 05:47 )             34.9     07-21    140  |  105  |  6<L>  ----------------------------<  83  4.1   |  29  |  0.57    Ca    8.5      21 Jul 2018 05:47    TPro  6.6  /  Alb  2.4<L>  /  TBili  0.2  /  DBili  x   /  AST  26  /  ALT  29  /  AlkPhos  54  07-20    PTT - ( 20 Jul 2018 14:49 )  PTT:37.5 sec    CAPILLARY BLOOD GLUCOSE                MEDICATIONS  (STANDING):  aztreonam  IVPB 2000 milliGRAM(s) IV Intermittent every 8 hours  cyanocobalamin 1000 MICROGram(s) Oral daily  doxycycline hyclate Capsule 100 milliGRAM(s) Oral every 12 hours  escitalopram 15 milliGRAM(s) Oral at bedtime  lactobacillus acidophilus 1 Tablet(s) Oral three times a day with meals  loratadine 10 milliGRAM(s) Oral daily  phenazopyridine 100 milliGRAM(s) Oral every 8 hours  sodium chloride 0.9%. 1000 milliLiter(s) (100 mL/Hr) IV Continuous <Continuous>  traZODone 50 milliGRAM(s) Oral at bedtime  KATHY (oral contraceptive) Drosperi/ethiny 1 Tablet(s) 1 Tablet(s) Oral daily    MEDICATIONS  (PRN):  acetaminophen   Tablet 650 milliGRAM(s) Oral every 6 hours PRN For Temp greater than 38 C (100.4 F)  acetaminophen   Tablet. 500 milliGRAM(s) Oral daily PRN Mild Pain (1 - 3)  diphenhydrAMINE   Capsule 25 milliGRAM(s) Oral every 6 hours PRN Rash and/or Itching  fluticasone propionate 50 MICROgram(s)/spray Nasal Spray 1 Spray(s) Both Nostrils two times a day PRN nasal decongestant  ibuprofen  Tablet 400 milliGRAM(s) Oral every 6 hours PRN for pain  morphine  - Injectable 2 milliGRAM(s) IV Push every 3 hours PRN pain or migraine  ondansetron Injectable 4 milliGRAM(s) IV Push every 6 hours PRN Nausea and/or Vomiting  oxyCODONE    5 mG/acetaminophen 325 mG 1 Tablet(s) Oral every 6 hours PRN moderate to severe pain (4-10)  pseudoephedrine 60 milliGRAM(s) Oral every 6 hours PRN sinus pressure  SUMAtriptan 50 milliGRAM(s) Oral four times a day PRN migraine      REVIEW OF SYSTEMS:  CONSTITUTIONAL: No fever, weight loss, or fatigue  EYES: No eye pain, visual disturbances, or discharge  ENMT:  No difficulty hearing, tinnitus, vertigo; No sinus or throat pain  NECK: No pain or stiffness  RESPIRATORY: No cough, wheezing, chills or hemoptysis; No shortness of breath  CARDIOVASCULAR: No chest pain, palpitations, dizziness, or leg swelling  GASTROINTESTINAL: No abdominal or epigastric pain. No nausea, vomiting, or hematemesis; No diarrhea or constipation. No melena or hematochezia.  GENITOURINARY: No dysuria, frequency, hematuria, or incontinence  NEUROLOGICAL: No headaches, memory loss, loss of strength, numbness, or tremors  SKIN: No itching, burning, rashes, or lesions   LYMPH NODES: No enlarged glands  ENDOCRINE: No heat or cold intolerance; No hair loss  MUSCULOSKELETAL: no neck stiffness, c/o bl ankle discomfort  PSYCHIATRIC: No depression, anxiety, mood swings, or difficulty sleeping  HEME/LYMPH: No easy bruising, or bleeding gums  ALLERY AND IMMUNOLOGIC: No hives or eczema    RADIOLOGY & ADDITIONAL TESTS:    Imaging Personally Reviewed:  [ ] YES  [ ] NO    Consultant(s) Notes Reviewed:  [ ] YES  [ ] NO    PHYSICAL EXAM:  GENERAL: NAD, well-groomed, well-developed  HEAD:  Atraumatic, Normocephalic  EYES: EOMI, PERRLA, conjunctiva and sclera clear  ENMT: No tonsillar erythema, exudates, or enlargement; Moist mucous membranes, Good dentition, No lesions  NECK: Supple, No JVD, Normal thyroid  NERVOUS SYSTEM:  Alert & Oriented X3, Good concentration; Motor Strength 5/5 B/L upper and lower extremities; DTRs 2+ intact and symmetric, no neck stiffness  CHEST/LUNG: Clear to percussion bilaterally; No rales, rhonchi, wheezing, or rubs  HEART: Regular rate and rhythm; No murmurs, rubs, or gallops  ABDOMEN: Soft, Nontender, Nondistended; Bowel sounds present  EXTREMITIES:  2+ Peripheral Pulses, No clubbing, cyanosis, or edema  LYMPH: No lymphadenopathy noted  SKIN: No rashes or lesions    Care Discussed with Consultants/Other Providers [ ] YES  [ ] NO

## 2018-07-21 NOTE — CHART NOTE - NSCHARTNOTEFT_GEN_A_CORE
RN called as patient was complaining of headache and nausea earlier in the night.  Patient was seen and examined at bedside.  Patient states that she usually has headaches from sinus congestion and takes Sudafed, which helps with pain, but has not been improving her symptoms.  Patient was given Zofran, but did not help her nausea.  Has headache for the last few days but today is worse.  States that headache is all across her forehead, associated with nausea, admits to photophobia, and phonophobia, nausea and vomiting NBNB, denies lacrimal tearing, changes in vision, changes in hearing, chest pain, SOB, difficulty breathing.    Vital Signs Last 24 Hrs  T(C): 37 (20 Jul 2018 23:47), Max: 38.8 (20 Jul 2018 05:47)  T(F): 98.6 (20 Jul 2018 23:47), Max: 101.8 (20 Jul 2018 05:47)  HR: 66 (20 Jul 2018 23:47) (66 - 82)  BP: 117/75 (20 Jul 2018 23:47) (93/60 - 117/75)  RR: 16 (20 Jul 2018 23:47) (16 - 16)  SpO2: 96% (20 Jul 2018 23:47) (95% - 96%)    Physical Exam:  General: Well developed, well nourished, mild acute distress 2/2 headache  HEENT:  slight tenderness to palpation of maxillary sinuses b/l, tenderness to palpation of temporal bones b/l  Neurology: A&Ox3, nonfocal, sensation intact, no gait abnormalities   Respiratory: CTA B/L, No W/R/R  CV: RRR, +S1/S2, no murmurs, rubs or gallops    Assessment and Plan:  27yo F with PMH of Depression, Interstitial Cystitis, Insomnia, 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  Now with headache and nasuea  1. Migraine  -Ordered sumatriptan 50mg X1, discussed with pharmacy as patient is also on SSRI- pharmacy said small one time dose is okay and will not cause interactions  -Continue to monitor patient   -RN to inform of any acute changes

## 2018-07-21 NOTE — PROGRESS NOTE ADULT - PROBLEM SELECTOR PLAN 4
Gyn Dr Maloney consulted   admits to white vaginal discharge  Based on history of near-continuous antibiotic usage, treat for yeast infection presumptively  Diflucan 200 mg PO daily

## 2018-07-21 NOTE — PROGRESS NOTE ADULT - PROBLEM SELECTOR PLAN 1
Uro consulted - Dr. Rose  IV Azactam 2000 mg IV Q8h  f/u cultures and sensitivities   cc/hr  fever 102.3 - CT stone [facility] - IMPRESSION: No obstructive urolithiasis

## 2018-07-22 DIAGNOSIS — R51 HEADACHE: ICD-10-CM

## 2018-07-22 LAB
ANION GAP SERPL CALC-SCNC: 4 MMOL/L — LOW (ref 5–17)
APTT BLD: 33.3 SEC — SIGNIFICANT CHANGE UP (ref 27.5–37.4)
BASOPHILS # BLD AUTO: 0.02 K/UL — SIGNIFICANT CHANGE UP (ref 0–0.2)
BASOPHILS NFR BLD AUTO: 0.3 % — SIGNIFICANT CHANGE UP (ref 0–2)
BUN SERPL-MCNC: 7 MG/DL — SIGNIFICANT CHANGE UP (ref 7–23)
CALCIUM SERPL-MCNC: 8.5 MG/DL — SIGNIFICANT CHANGE UP (ref 8.5–10.1)
CHLORIDE SERPL-SCNC: 102 MMOL/L — SIGNIFICANT CHANGE UP (ref 96–108)
CO2 SERPL-SCNC: 33 MMOL/L — HIGH (ref 22–31)
CREAT SERPL-MCNC: 0.55 MG/DL — SIGNIFICANT CHANGE UP (ref 0.5–1.3)
EOSINOPHIL # BLD AUTO: 0.1 K/UL — SIGNIFICANT CHANGE UP (ref 0–0.5)
EOSINOPHIL NFR BLD AUTO: 1.6 % — SIGNIFICANT CHANGE UP (ref 0–6)
GLUCOSE SERPL-MCNC: 92 MG/DL — SIGNIFICANT CHANGE UP (ref 70–99)
HCT VFR BLD CALC: 35.3 % — SIGNIFICANT CHANGE UP (ref 34.5–45)
HGB BLD-MCNC: 11.5 G/DL — SIGNIFICANT CHANGE UP (ref 11.5–15.5)
HIV 1+2 AB+HIV1 P24 AG SERPL QL IA: SIGNIFICANT CHANGE UP
IMM GRANULOCYTES NFR BLD AUTO: 0.3 % — SIGNIFICANT CHANGE UP (ref 0–1.5)
LYMPHOCYTES # BLD AUTO: 1.87 K/UL — SIGNIFICANT CHANGE UP (ref 1–3.3)
LYMPHOCYTES # BLD AUTO: 29.9 % — SIGNIFICANT CHANGE UP (ref 13–44)
MCHC RBC-ENTMCNC: 27.8 PG — SIGNIFICANT CHANGE UP (ref 27–34)
MCHC RBC-ENTMCNC: 32.6 GM/DL — SIGNIFICANT CHANGE UP (ref 32–36)
MCV RBC AUTO: 85.5 FL — SIGNIFICANT CHANGE UP (ref 80–100)
MONOCYTES # BLD AUTO: 1.12 K/UL — HIGH (ref 0–0.9)
MONOCYTES NFR BLD AUTO: 17.9 % — HIGH (ref 2–14)
NEUTROPHILS # BLD AUTO: 3.13 K/UL — SIGNIFICANT CHANGE UP (ref 1.8–7.4)
NEUTROPHILS NFR BLD AUTO: 50 % — SIGNIFICANT CHANGE UP (ref 43–77)
PLATELET # BLD AUTO: 194 K/UL — SIGNIFICANT CHANGE UP (ref 150–400)
POTASSIUM SERPL-MCNC: 3.6 MMOL/L — SIGNIFICANT CHANGE UP (ref 3.5–5.3)
POTASSIUM SERPL-SCNC: 3.6 MMOL/L — SIGNIFICANT CHANGE UP (ref 3.5–5.3)
RBC # BLD: 4.13 M/UL — SIGNIFICANT CHANGE UP (ref 3.8–5.2)
RBC # FLD: 12.8 % — SIGNIFICANT CHANGE UP (ref 10.3–14.5)
RHEUMATOID FACT SERPL-ACNC: <10 IU/ML — SIGNIFICANT CHANGE UP (ref 0–13)
SODIUM SERPL-SCNC: 139 MMOL/L — SIGNIFICANT CHANGE UP (ref 135–145)
WBC # BLD: 6.26 K/UL — SIGNIFICANT CHANGE UP (ref 3.8–10.5)
WBC # FLD AUTO: 6.26 K/UL — SIGNIFICANT CHANGE UP (ref 3.8–10.5)

## 2018-07-22 RX ORDER — METOCLOPRAMIDE HCL 10 MG
10 TABLET ORAL ONCE
Qty: 0 | Refills: 0 | Status: COMPLETED | OUTPATIENT
Start: 2018-07-22 | End: 2018-07-22

## 2018-07-22 RX ORDER — KETOROLAC TROMETHAMINE 30 MG/ML
10 SYRINGE (ML) INJECTION EVERY 8 HOURS
Qty: 0 | Refills: 0 | Status: DISCONTINUED | OUTPATIENT
Start: 2018-07-22 | End: 2018-07-23

## 2018-07-22 RX ORDER — METOCLOPRAMIDE HCL 10 MG
10 TABLET ORAL EVERY 8 HOURS
Qty: 0 | Refills: 0 | Status: DISCONTINUED | OUTPATIENT
Start: 2018-07-22 | End: 2018-07-23

## 2018-07-22 RX ORDER — DIPHENHYDRAMINE HCL 50 MG
25 CAPSULE ORAL ONCE
Qty: 0 | Refills: 0 | Status: COMPLETED | OUTPATIENT
Start: 2018-07-22 | End: 2018-07-22

## 2018-07-22 RX ORDER — KETOROLAC TROMETHAMINE 30 MG/ML
10 SYRINGE (ML) INJECTION ONCE
Qty: 0 | Refills: 0 | Status: DISCONTINUED | OUTPATIENT
Start: 2018-07-22 | End: 2018-07-22

## 2018-07-22 RX ADMIN — Medication 10 MILLIGRAM(S): at 21:25

## 2018-07-22 RX ADMIN — OXYCODONE AND ACETAMINOPHEN 1 TABLET(S): 5; 325 TABLET ORAL at 05:30

## 2018-07-22 RX ADMIN — Medication 50 MILLIGRAM(S): at 21:54

## 2018-07-22 RX ADMIN — Medication 1 TABLET(S): at 07:56

## 2018-07-22 RX ADMIN — OXYCODONE AND ACETAMINOPHEN 1 TABLET(S): 5; 325 TABLET ORAL at 15:31

## 2018-07-22 RX ADMIN — Medication 10 MILLIGRAM(S): at 11:22

## 2018-07-22 RX ADMIN — Medication 100 MILLIGRAM(S): at 21:52

## 2018-07-22 RX ADMIN — OXYCODONE AND ACETAMINOPHEN 1 TABLET(S): 5; 325 TABLET ORAL at 16:31

## 2018-07-22 RX ADMIN — Medication 60 MILLIGRAM(S): at 04:30

## 2018-07-22 RX ADMIN — Medication 100 MILLIGRAM(S): at 05:47

## 2018-07-22 RX ADMIN — Medication 100 MILLIGRAM(S): at 14:02

## 2018-07-22 RX ADMIN — OXYCODONE AND ACETAMINOPHEN 1 TABLET(S): 5; 325 TABLET ORAL at 04:30

## 2018-07-22 RX ADMIN — Medication 60 MILLIGRAM(S): at 14:16

## 2018-07-22 RX ADMIN — Medication 10 MILLIGRAM(S): at 21:35

## 2018-07-22 RX ADMIN — Medication 100 MILLIGRAM(S): at 05:48

## 2018-07-22 RX ADMIN — SUMATRIPTAN SUCCINATE 50 MILLIGRAM(S): 4 INJECTION, SOLUTION SUBCUTANEOUS at 19:13

## 2018-07-22 RX ADMIN — Medication 10 MILLIGRAM(S): at 21:29

## 2018-07-22 RX ADMIN — Medication 100 MILLIGRAM(S): at 21:53

## 2018-07-22 RX ADMIN — Medication 25 MILLIGRAM(S): at 21:51

## 2018-07-22 RX ADMIN — Medication 1 TABLET(S): at 14:02

## 2018-07-22 RX ADMIN — PREGABALIN 1000 MICROGRAM(S): 225 CAPSULE ORAL at 14:02

## 2018-07-22 RX ADMIN — Medication 1 TABLET(S): at 18:51

## 2018-07-22 RX ADMIN — Medication 10 MILLIGRAM(S): at 11:37

## 2018-07-22 NOTE — PROGRESS NOTE ADULT - SUBJECTIVE AND OBJECTIVE BOX
Interval History:  still complains of headache    Chart reviewed and events noted;   Overnight events:    MEDICATIONS  (STANDING):  aztreonam  IVPB 2000 milliGRAM(s) IV Intermittent every 8 hours  cyanocobalamin 1000 MICROGram(s) Oral daily  escitalopram 15 milliGRAM(s) Oral at bedtime  lactobacillus acidophilus 1 Tablet(s) Oral three times a day with meals  loratadine 10 milliGRAM(s) Oral daily  phenazopyridine 100 milliGRAM(s) Oral every 8 hours  sodium chloride 0.9%. 1000 milliLiter(s) (100 mL/Hr) IV Continuous <Continuous>  traZODone 50 milliGRAM(s) Oral at bedtime  KATHY (oral contraceptive) Drosperi/ethiny 1 Tablet(s) 1 Tablet(s) Oral daily    MEDICATIONS  (PRN):  acetaminophen   Tablet 650 milliGRAM(s) Oral every 6 hours PRN For Temp greater than 38 C (100.4 F)  acetaminophen   Tablet. 500 milliGRAM(s) Oral daily PRN Mild Pain (1 - 3)  diphenhydrAMINE   Capsule 25 milliGRAM(s) Oral every 6 hours PRN Rash and/or Itching  fluticasone propionate 50 MICROgram(s)/spray Nasal Spray 1 Spray(s) Both Nostrils two times a day PRN nasal decongestant  HYDROmorphone  Injectable 0.5 milliGRAM(s) IV Push every 4 hours PRN severe pain or severe migraine  ibuprofen  Tablet 400 milliGRAM(s) Oral every 6 hours PRN for pain  ondansetron Injectable 4 milliGRAM(s) IV Push every 6 hours PRN Nausea and/or Vomiting  oxyCODONE    5 mG/acetaminophen 325 mG 1 Tablet(s) Oral every 6 hours PRN moderate to severe pain (4-10)  pseudoephedrine 60 milliGRAM(s) Oral every 6 hours PRN sinus pressure  SUMAtriptan 50 milliGRAM(s) Oral every 4 hours PRN Migrane      Vital Signs Last 24 Hrs  T(C): 36.4 (22 Jul 2018 05:50), Max: 37.6 (21 Jul 2018 14:26)  T(F): 97.6 (22 Jul 2018 05:50), Max: 99.6 (21 Jul 2018 14:26)  HR: 59 (22 Jul 2018 05:50) (59 - 73)  BP: 86/48 (22 Jul 2018 05:50) (86/48 - 103/68)  BP(mean): --  RR: 16 (22 Jul 2018 05:50) (16 - 16)  SpO2: 94% (22 Jul 2018 05:50) (94% - 97%)    PHYSICAL EXAM  General: adult in NAD  HEENT: clear oropharynx, anicteric sclera, pink conjunctivae  Neck: supple  CV: normal S1S2 with no murmur rubs or gallops  Lungs: clear to auscultation, no wheezes, no rhales  Abdomen: soft non-tender non-distended, no hepato/splenomegaly  Ext: no clubbing cyanosis or edema  Skin: no rashes and no petichiae  Neuro: alert and oriented X3 no focal deficits      LABS:  CBC Full  -  ( 22 Jul 2018 07:28 )  WBC Count : 6.26 K/uL  Hemoglobin : 11.5 g/dL  Hematocrit : 35.3 %  Platelet Count - Automated : 194 K/uL  Mean Cell Volume : 85.5 fl  Mean Cell Hemoglobin : 27.8 pg  Mean Cell Hemoglobin Concentration : 32.6 gm/dL  Auto Neutrophil # : 3.13 K/uL  Auto Lymphocyte # : 1.87 K/uL  Auto Monocyte # : 1.12 K/uL  Auto Eosinophil # : 0.10 K/uL  Auto Basophil # : 0.02 K/uL  Auto Neutrophil % : 50.0 %  Auto Lymphocyte % : 29.9 %  Auto Monocyte % : 17.9 %  Auto Eosinophil % : 1.6 %  Auto Basophil % : 0.3 %    07-22    139  |  102  |  7   ----------------------------<  92  3.6   |  33<H>  |  0.55    Ca    8.5      22 Jul 2018 07:28      PTT - ( 22 Jul 2018 07:28 )  PTT:33.3 sec    fe studies      WBC trend  6.26 K/uL (07-22-18 @ 07:28)  7.01 K/uL (07-21-18 @ 05:47)  11.50 K/uL (07-20-18 @ 08:35)      Hgb trend  11.5 g/dL (07-22-18 @ 07:28)  11.7 g/dL (07-21-18 @ 05:47)  11.2 g/dL (07-20-18 @ 08:35)      plt trend  194 K/uL (07-22-18 @ 07:28)  143 K/uL (07-21-18 @ 05:47)  108 K/uL (07-20-18 @ 08:35)        RADIOLOGY & ADDITIONAL STUDIES:

## 2018-07-22 NOTE — PROGRESS NOTE ADULT - SUBJECTIVE AND OBJECTIVE BOX
neuro cons dict.  severe HA probably vascular.  cns infection unlikely.  HA better with Imitrex but she is on ssri.  would try iv toradol and reglan combination instead.

## 2018-07-22 NOTE — PROGRESS NOTE ADULT - SUBJECTIVE AND OBJECTIVE BOX
Patient is a 28y old  Female who presents with a chief complaint of fever (20 Jul 2018 09:08)  still complains of sig HA.  no neck stiffness, no photophobia  bl ankle pain    INTERVAL HPI/OVERNIGHT EVENTS:  T(C): 36.4 (07-22-18 @ 05:50), Max: 37.6 (07-21-18 @ 14:26)  HR: 59 (07-22-18 @ 05:50) (59 - 73)  BP: 86/48 (07-22-18 @ 05:50) (86/48 - 103/68)  RR: 16 (07-22-18 @ 05:50) (16 - 16)  SpO2: 94% (07-22-18 @ 05:50) (94% - 97%)  Wt(kg): --  I&O's Summary      LABS:                        11.7   7.01  )-----------( 143      ( 21 Jul 2018 05:47 )             34.9     07-21    140  |  105  |  6<L>  ----------------------------<  83  4.1   |  29  |  0.57    Ca    8.5      21 Jul 2018 05:47    TPro  6.6  /  Alb  2.4<L>  /  TBili  0.2  /  DBili  x   /  AST  26  /  ALT  29  /  AlkPhos  54  07-20    PTT - ( 20 Jul 2018 14:49 )  PTT:37.5 sec        MEDICATIONS  (STANDING):  aztreonam  IVPB 2000 milliGRAM(s) IV Intermittent every 8 hours  cyanocobalamin 1000 MICROGram(s) Oral daily  escitalopram 15 milliGRAM(s) Oral at bedtime  lactobacillus acidophilus 1 Tablet(s) Oral three times a day with meals  loratadine 10 milliGRAM(s) Oral daily  phenazopyridine 100 milliGRAM(s) Oral every 8 hours  sodium chloride 0.9%. 1000 milliLiter(s) (100 mL/Hr) IV Continuous <Continuous>  traZODone 50 milliGRAM(s) Oral at bedtime  KATHY (oral contraceptive) Drosperi/ethiny 1 Tablet(s) 1 Tablet(s) Oral daily    MEDICATIONS  (PRN):  acetaminophen   Tablet 650 milliGRAM(s) Oral every 6 hours PRN For Temp greater than 38 C (100.4 F)  acetaminophen   Tablet. 500 milliGRAM(s) Oral daily PRN Mild Pain (1 - 3)  diphenhydrAMINE   Capsule 25 milliGRAM(s) Oral every 6 hours PRN Rash and/or Itching  fluticasone propionate 50 MICROgram(s)/spray Nasal Spray 1 Spray(s) Both Nostrils two times a day PRN nasal decongestant  HYDROmorphone  Injectable 0.5 milliGRAM(s) IV Push every 4 hours PRN severe pain or severe migraine  ibuprofen  Tablet 400 milliGRAM(s) Oral every 6 hours PRN for pain  ondansetron Injectable 4 milliGRAM(s) IV Push every 6 hours PRN Nausea and/or Vomiting  oxyCODONE    5 mG/acetaminophen 325 mG 1 Tablet(s) Oral every 6 hours PRN moderate to severe pain (4-10)  pseudoephedrine 60 milliGRAM(s) Oral every 6 hours PRN sinus pressure  SUMAtriptan 50 milliGRAM(s) Oral every 4 hours PRN Migrane      REVIEW OF SYSTEMS:  CONSTITUTIONAL: fevers improving.  describes generalized malaise  EYES: No eye pain, visual disturbances, or discharge  ENMT:  No difficulty hearing, tinnitus, vertigo; No sinus or throat pain  NECK: No pain or stiffness  RESPIRATORY: No cough, wheezing, chills or hemoptysis; No shortness of breath  CARDIOVASCULAR: No chest pain, palpitations, dizziness, or leg swelling  GASTROINTESTINAL: No abdominal or epigastric pain. No nausea, vomiting, or hematemesis; No diarrhea or constipation. No melena or hematochezia.  GENITOURINARY: No dysuria, frequency, hematuria, or incontinence  NEUROLOGICAL: headache  SKIN: No itching, burning, rashes, or lesions   LYMPH NODES: No enlarged glands  ENDOCRINE: No heat or cold intolerance; No hair loss  MUSCULOSKELETAL: bl ankle pain  PSYCHIATRIC: No depression, anxiety, mood swings, or difficulty sleeping  HEME/LYMPH: No easy bruising, or bleeding gums  ALLERY AND IMMUNOLOGIC: No hives or eczema    RADIOLOGY & ADDITIONAL TESTS:    Imaging Personally Reviewed:  [ ] YES  [ ] NO    Consultant(s) Notes Reviewed:  [ ] YES  [ ] NO    PHYSICAL EXAM:  GENERAL: NAD, well-groomed, well-developed  HEAD:  Atraumatic, Normocephalic  EYES: EOMI, PERRLA, conjunctiva and sclera clear  ENMT: No tonsillar erythema, exudates, or enlargement; Moist mucous membranes, Good dentition, No lesions  NECK: Supple, No JVD, Normal thyroid  NERVOUS SYSTEM:  Alert & Oriented X3, Good concentration; Motor Strength 5/5 B/L upper and lower extremities; DTRs 2+ intact and symmetric  CHEST/LUNG: Clear to percussion bilaterally; No rales, rhonchi, wheezing, or rubs  HEART: Regular rate and rhythm; No murmurs, rubs, or gallops  ABDOMEN: Soft, Nontender, Nondistended; Bowel sounds present  EXTREMITIES:  2+ Peripheral Pulses, No clubbing, cyanosis, or edema  LYMPH: No lymphadenopathy noted  SKIN: No rashes or lesions    Care Discussed with Consultants/Other Providers [ ] YES  [ ] NO

## 2018-07-22 NOTE — PROGRESS NOTE ADULT - SUBJECTIVE AND OBJECTIVE BOX
ALYSHA BENNETT is a 28yFemale , patient examined and chart reviewed.     INTERVAL HPI/ OVERNIGHT EVENTS:   Still with HAs but better.  Seen by Neurology.  Afebrile.    PAST MEDICAL & SURGICAL HISTORY:  ITP secondary to infection  IC (interstitial cystitis)  Insomnia  Depressed  History of biopsy of bladder  No significant past surgical history      For details regarding the patient's social history, family history, and other miscellaneous elements, please refer the initial infectious diseases consultation and/or the admitting history and physical examination for this admission.      ROS:  CONSTITUTIONAL:  Negative fever or chills  EYES:  Negative  blurry vision or double vision  CARDIOVASCULAR:  Negative for chest pain or palpitations  RESPIRATORY:  Negative for cough, wheezing, or SOB   GASTROINTESTINAL:  Negative for nausea, vomiting, diarrhea, constipation, or abdominal pain  GENITOURINARY:  Negative frequency, urgency or dysuria  NEUROLOGIC:  + headache, No confusion, dizziness, lightheadedness  All other systems were reviewed and are negative     ALLERGIES:  cefaclor (Hives)      Current inpatient medications :    ANTIBIOTICS/RELEVANT:  aztreonam  IVPB 2000 milliGRAM(s) IV Intermittent every 8 hours  lactobacillus acidophilus 1 Tablet(s) Oral three times a day with meals  KATHY (oral contraceptive) Drosperi/ethiny 1 Tablet(s) 1 Tablet(s) Oral daily      acetaminophen   Tablet 650 milliGRAM(s) Oral every 6 hours PRN  acetaminophen   Tablet. 500 milliGRAM(s) Oral daily PRN  cyanocobalamin 1000 MICROGram(s) Oral daily  diphenhydrAMINE   Capsule 25 milliGRAM(s) Oral every 6 hours PRN  escitalopram 15 milliGRAM(s) Oral at bedtime  fluticasone propionate 50 MICROgram(s)/spray Nasal Spray 1 Spray(s) Both Nostrils two times a day PRN  HYDROmorphone  Injectable 0.5 milliGRAM(s) IV Push every 4 hours PRN  ketorolac   Injectable 10 milliGRAM(s) IV Push every 8 hours PRN  loratadine 10 milliGRAM(s) Oral daily  metoclopramide Injectable 10 milliGRAM(s) IV Push every 8 hours PRN  ondansetron Injectable 4 milliGRAM(s) IV Push every 6 hours PRN  oxyCODONE    5 mG/acetaminophen 325 mG 1 Tablet(s) Oral every 6 hours PRN  phenazopyridine 100 milliGRAM(s) Oral every 8 hours  pseudoephedrine 60 milliGRAM(s) Oral every 6 hours PRN  sodium chloride 0.9%. 1000 milliLiter(s) IV Continuous <Continuous>  traZODone 50 milliGRAM(s) Oral at bedtime      Objective:    T(C): 36.8 (07-22-18 @ 14:09), Max: 36.8 (07-22-18 @ 14:09)  HR: 54 (07-22-18 @ 14:09) (54 - 59)  BP: 101/68 (07-22-18 @ 14:09) (86/48 - 101/68)  RR: 16 (07-22-18 @ 14:09) (16 - 16)  SpO2: 94% (07-22-18 @ 14:09) (94% - 94%)  Wt(kg): --      Physical Exam:  General: no acute distress  Eyes: sclera anicteric, pupils equal and reactive to light  ENMT: buccal mucosa moist, pharynx not injected  Neck: supple, trachea midline  Lungs: clear, no wheeze/rhonchi  Cardiovascular: regular rate and rhythm, S1 S2  Abdomen: soft, nontender, no organomegaly present, bowel sounds normal  Neurological: alert and oriented x3, Cranial Nerves II-XII grossly intact  Skin: no increased ecchymosis/petechiae/purpura  Lymph Nodes: no palpable cervical/supraclavicular lymph nodes enlargements  Extremities: no cyanosis/clubbing/edema      LABS:                          11.5   6.26  )-----------( 194      ( 22 Jul 2018 07:28 )             35.3       07-22    139  |  102  |  7   ----------------------------<  92  3.6   |  33<H>  |  0.55    Ca    8.5      22 Jul 2018 07:28        PTT - ( 22 Jul 2018 07:28 )  PTT:33.3 sec      Culture - Blood (collected 18 Jul 2018 21:03)  Source: .Blood Blood-Peripheral  Preliminary Report (19 Jul 2018 22:01):    No growth to date.    Culture - Urine (07.18.18 @ 16:07)    Specimen Source: .Urine Clean Catch (Midstream)    Culture Results:   <10,000 CFU/ml Normal Urogenital airam present    Borrelia burgdorferi IgG/IgM Antibodies (07.20.18 @ 11:01)    LYME IgG/IgM Antibodies Result: 0.05 Index    Lyme C6 Interpretation: Negative: METHOD: ANALIA      Reference Range: (values expressed as Lyme Index )                                < 0.90        Negative                                0.90 - 1.09   Equivocal                                >= 1.10        Positive      CDC/ASTPHLDGuidelines recommend that all samples judged equivocal or      positive be re-tested by immunoblot for confirmation of results.      RADIOLOGY & ADDITIONAL STUDIES:  EXAM:  CT BRAIN                            PROCEDURE DATE:  07/21/2018          INTERPRETATION:  CLINICAL INFORMATION: Headache.    TECHNIQUE:  Serial axial images were obtained from the skull base to the   vertex without intravenous contrast. Coronal and sagittal reformatted   images were obtained.    COMPARISON EXAMINATION: June 8, 2008.    FINDINGS:      VENTRICLES AND SULCI:  Within normal limits for patient's age.  INTRA-AXIAL:  No acute intracranial hemorrhage, mass effect, or evidence   of acute territorial infarct.   EXTRA-AXIAL:  No mass or collection is seen.  VISUALIZED SINUSES:  No air-fluid levels.  VISUALIZED MASTOIDS:  Clear.  CALVARIUM:  Normal.  MISCELLANEOUS:  None.    IMPRESSION:    No acute intracranial hemorrhage, mass effect, or evidence of acute   territorial infarct.   If clinical symptoms persist, recommend follow-up imaging with MRI brain   if there are no contraindications.    Assessment :  27yo F with PMH of Depression, Interstitial Cystitis, Insomnia, ITP 2/2 Infection admitted with   likely acute cystitis sp cystoscopy with biopsy a week prior to admission   Had been on Cipro since biopsy.   Cultures ngtd as pt was on antibx prior to admission  Thrombocytopenia with hx ITP  Headaches persist seen by neurology    Plan :   Cont Azactam  Plan to change to po Bactrim x 7 days in am  Neurology following      Continue with present regiment.  Appropriate use of antibiotics and adverse effects reviewed.      I have discussed the above plan of care with patient/ family in detail. They expressed understanding of the  treatment plan . Risks, benefits and alternatives discussed in detail. I have asked if they have any questions or concerns and appropriately addressed them to the best of my ability .    > 35 minutes were spent in direct patient care reviewing notes, medications ,labs data/ imaging , discussion with multidisciplinary team.    Thank you for allowing me to participate in care of your patient .    Nima Yanes MD  Infectious Disease  [phone]

## 2018-07-23 VITALS
SYSTOLIC BLOOD PRESSURE: 96 MMHG | DIASTOLIC BLOOD PRESSURE: 62 MMHG | OXYGEN SATURATION: 94 % | TEMPERATURE: 98 F | RESPIRATION RATE: 16 BRPM | HEART RATE: 66 BPM

## 2018-07-23 LAB
ANION GAP SERPL CALC-SCNC: 6 MMOL/L — SIGNIFICANT CHANGE UP (ref 5–17)
BUN SERPL-MCNC: 5 MG/DL — LOW (ref 7–23)
CALCIUM SERPL-MCNC: 8.5 MG/DL — SIGNIFICANT CHANGE UP (ref 8.5–10.1)
CHLORIDE SERPL-SCNC: 104 MMOL/L — SIGNIFICANT CHANGE UP (ref 96–108)
CMV IGM FLD-ACNC: 15.7 AU/ML — SIGNIFICANT CHANGE UP
CMV IGM SERPL QL: NEGATIVE — SIGNIFICANT CHANGE UP
CO2 SERPL-SCNC: 31 MMOL/L — SIGNIFICANT CHANGE UP (ref 22–31)
CREAT SERPL-MCNC: 0.47 MG/DL — LOW (ref 0.5–1.3)
CULTURE RESULTS: SIGNIFICANT CHANGE UP
CULTURE RESULTS: SIGNIFICANT CHANGE UP
EBV EA AB SER IA-ACNC: 56.8 U/ML — HIGH
EBV EA AB TITR SER IF: POSITIVE
EBV EA IGG SER-ACNC: POSITIVE
EBV NA IGG SER IA-ACNC: 394 U/ML — HIGH
EBV PATRN SPEC IB-IMP: SIGNIFICANT CHANGE UP
EBV VCA IGG AVIDITY SER QL IA: POSITIVE
EBV VCA IGM SER IA-ACNC: 35.9 U/ML — SIGNIFICANT CHANGE UP
EBV VCA IGM SER IA-ACNC: 499 U/ML — HIGH
EBV VCA IGM TITR FLD: NEGATIVE — SIGNIFICANT CHANGE UP
GLUCOSE SERPL-MCNC: 88 MG/DL — SIGNIFICANT CHANGE UP (ref 70–99)
HCT VFR BLD CALC: 34 % — LOW (ref 34.5–45)
HGB BLD-MCNC: 11.4 G/DL — LOW (ref 11.5–15.5)
MCHC RBC-ENTMCNC: 28.1 PG — SIGNIFICANT CHANGE UP (ref 27–34)
MCHC RBC-ENTMCNC: 33.5 GM/DL — SIGNIFICANT CHANGE UP (ref 32–36)
MCV RBC AUTO: 83.7 FL — SIGNIFICANT CHANGE UP (ref 80–100)
NRBC # BLD: 0 /100 WBCS — SIGNIFICANT CHANGE UP (ref 0–0)
PLATELET # BLD AUTO: 227 K/UL — SIGNIFICANT CHANGE UP (ref 150–400)
POTASSIUM SERPL-MCNC: 4.2 MMOL/L — SIGNIFICANT CHANGE UP (ref 3.5–5.3)
POTASSIUM SERPL-SCNC: 4.2 MMOL/L — SIGNIFICANT CHANGE UP (ref 3.5–5.3)
RBC # BLD: 4.06 M/UL — SIGNIFICANT CHANGE UP (ref 3.8–5.2)
RBC # FLD: 12.5 % — SIGNIFICANT CHANGE UP (ref 10.3–14.5)
SODIUM SERPL-SCNC: 141 MMOL/L — SIGNIFICANT CHANGE UP (ref 135–145)
SPECIMEN SOURCE: SIGNIFICANT CHANGE UP
SPECIMEN SOURCE: SIGNIFICANT CHANGE UP
WBC # BLD: 8.98 K/UL — SIGNIFICANT CHANGE UP (ref 3.8–10.5)
WBC # FLD AUTO: 8.98 K/UL — SIGNIFICANT CHANGE UP (ref 3.8–10.5)

## 2018-07-23 PROCEDURE — 85027 COMPLETE CBC AUTOMATED: CPT

## 2018-07-23 PROCEDURE — 87486 CHLMYD PNEUM DNA AMP PROBE: CPT

## 2018-07-23 PROCEDURE — 87581 M.PNEUMON DNA AMP PROBE: CPT

## 2018-07-23 PROCEDURE — 71046 X-RAY EXAM CHEST 2 VIEWS: CPT

## 2018-07-23 PROCEDURE — 86618 LYME DISEASE ANTIBODY: CPT

## 2018-07-23 PROCEDURE — 87798 DETECT AGENT NOS DNA AMP: CPT

## 2018-07-23 PROCEDURE — 86663 EPSTEIN-BARR ANTIBODY: CPT

## 2018-07-23 PROCEDURE — 93005 ELECTROCARDIOGRAM TRACING: CPT

## 2018-07-23 PROCEDURE — 84702 CHORIONIC GONADOTROPIN TEST: CPT

## 2018-07-23 PROCEDURE — 86038 ANTINUCLEAR ANTIBODIES: CPT

## 2018-07-23 PROCEDURE — 81001 URINALYSIS AUTO W/SCOPE: CPT

## 2018-07-23 PROCEDURE — 87389 HIV-1 AG W/HIV-1&-2 AB AG IA: CPT

## 2018-07-23 PROCEDURE — 94640 AIRWAY INHALATION TREATMENT: CPT

## 2018-07-23 PROCEDURE — 86664 EPSTEIN-BARR NUCLEAR ANTIGEN: CPT

## 2018-07-23 PROCEDURE — 82746 ASSAY OF FOLIC ACID SERUM: CPT

## 2018-07-23 PROCEDURE — 70450 CT HEAD/BRAIN W/O DYE: CPT

## 2018-07-23 PROCEDURE — 86788 WEST NILE VIRUS AB IGM: CPT

## 2018-07-23 PROCEDURE — 96376 TX/PRO/DX INJ SAME DRUG ADON: CPT

## 2018-07-23 PROCEDURE — 86645 CMV ANTIBODY IGM: CPT

## 2018-07-23 PROCEDURE — 87633 RESP VIRUS 12-25 TARGETS: CPT

## 2018-07-23 PROCEDURE — 74176 CT ABD & PELVIS W/O CONTRAST: CPT

## 2018-07-23 PROCEDURE — 86665 EPSTEIN-BARR CAPSID VCA: CPT

## 2018-07-23 PROCEDURE — 85610 PROTHROMBIN TIME: CPT

## 2018-07-23 PROCEDURE — 99285 EMERGENCY DEPT VISIT HI MDM: CPT | Mod: 25

## 2018-07-23 PROCEDURE — 86431 RHEUMATOID FACTOR QUANT: CPT

## 2018-07-23 PROCEDURE — 96365 THER/PROPH/DIAG IV INF INIT: CPT

## 2018-07-23 PROCEDURE — 82607 VITAMIN B-12: CPT

## 2018-07-23 PROCEDURE — 87040 BLOOD CULTURE FOR BACTERIA: CPT

## 2018-07-23 PROCEDURE — 87086 URINE CULTURE/COLONY COUNT: CPT

## 2018-07-23 PROCEDURE — 96375 TX/PRO/DX INJ NEW DRUG ADDON: CPT

## 2018-07-23 PROCEDURE — 86789 WEST NILE VIRUS ANTIBODY: CPT

## 2018-07-23 PROCEDURE — 83605 ASSAY OF LACTIC ACID: CPT

## 2018-07-23 PROCEDURE — 80053 COMPREHEN METABOLIC PANEL: CPT

## 2018-07-23 PROCEDURE — 80048 BASIC METABOLIC PNL TOTAL CA: CPT

## 2018-07-23 PROCEDURE — 85730 THROMBOPLASTIN TIME PARTIAL: CPT

## 2018-07-23 RX ORDER — PHENAZOPYRIDINE HCL 100 MG
1 TABLET ORAL
Qty: 9 | Refills: 0 | OUTPATIENT
Start: 2018-07-23 | End: 2018-07-25

## 2018-07-23 RX ORDER — LACTOBACILLUS ACIDOPHILUS 100MM CELL
1 CAPSULE ORAL
Qty: 0 | Refills: 0 | COMMUNITY
Start: 2018-07-23 | End: 2018-08-05

## 2018-07-23 RX ORDER — SUMATRIPTAN SUCCINATE 4 MG/.5ML
1 INJECTION, SOLUTION SUBCUTANEOUS
Qty: 20 | Refills: 0 | OUTPATIENT
Start: 2018-07-23 | End: 2018-08-11

## 2018-07-23 RX ORDER — CIPROFLOXACIN LACTATE 400MG/40ML
1 VIAL (ML) INTRAVENOUS
Qty: 0 | Refills: 0 | COMMUNITY

## 2018-07-23 RX ADMIN — Medication 500 MILLIGRAM(S): at 04:30

## 2018-07-23 RX ADMIN — Medication 100 MILLIGRAM(S): at 06:22

## 2018-07-23 RX ADMIN — Medication 10 MILLIGRAM(S): at 06:22

## 2018-07-23 RX ADMIN — Medication 1 TABLET(S): at 06:22

## 2018-07-23 RX ADMIN — Medication 1 TABLET(S): at 08:13

## 2018-07-23 RX ADMIN — Medication 650 MILLIGRAM(S): at 11:49

## 2018-07-23 RX ADMIN — LORATADINE 10 MILLIGRAM(S): 10 TABLET ORAL at 11:48

## 2018-07-23 RX ADMIN — Medication 10 MILLIGRAM(S): at 07:00

## 2018-07-23 RX ADMIN — Medication 1 TABLET(S): at 11:48

## 2018-07-23 RX ADMIN — Medication 500 MILLIGRAM(S): at 03:52

## 2018-07-23 RX ADMIN — Medication 100 MILLIGRAM(S): at 14:01

## 2018-07-23 RX ADMIN — PREGABALIN 1000 MICROGRAM(S): 225 CAPSULE ORAL at 11:48

## 2018-07-23 NOTE — PROGRESS NOTE ADULT - PROBLEM SELECTOR PROBLEM 3
IC (interstitial cystitis)

## 2018-07-23 NOTE — PROGRESS NOTE ADULT - PROBLEM SELECTOR PLAN 9
neuro eval
Neuro eval   CT head: no acute intracranial hemorrhage, mass effect, or evidence of acute   territorial infarct  Consider MRI

## 2018-07-23 NOTE — PROGRESS NOTE ADULT - PROBLEM SELECTOR PLAN 3
s/p cystoscopy and biopsy 7 days ago  continue pyridium
s/p cystoscopy and biopsy 7 days ago  continue pyridium
s/p cystoscopy and biopsy  continue pyridium
s/p cystoscopy and biopsy 7 days ago  continue pyridium

## 2018-07-23 NOTE — PROGRESS NOTE ADULT - PROBLEM SELECTOR PROBLEM 1
UTI (urinary tract infection)
ITP secondary to infection
Acute cystitis without hematuria
IC (interstitial cystitis)
UTI (urinary tract infection)
ITP secondary to infection
ITP secondary to infection
UTI (urinary tract infection)
UTI (urinary tract infection)

## 2018-07-23 NOTE — PROGRESS NOTE ADULT - SUBJECTIVE AND OBJECTIVE BOX
Patient is a 28y old  Female who presents with a chief complaint of fever (20 Jul 2018 09:08)      INTERVAL HPI/OVERNIGHT EVENTS:  Patient seen and examined at bedside, states she feels much better. She still complains of headache, but noted improvement with Tylenol and IV tramadol. She denies relief with imitrex. Patient states muscle aches, abdominal pain, and nausea have resolved. She denies changes in her vision, chest pain, SOB, cough. Per ID, patient to be switched to PO Bactrim x 7 days for treatment of pyelo and UTI.    T(C): 36.7 (07-23-18 @ 05:23), Max: 37.3 (07-22-18 @ 22:58)  HR: 66 (07-23-18 @ 05:23) (54 - 69)  BP: 96/62 (07-23-18 @ 05:23) (96/62 - 111/75)  RR: 16 (07-23-18 @ 05:23) (16 - 16)  SpO2: 94% (07-23-18 @ 05:23) (94% - 94%)  Wt(kg): --  I&O's Summary      LABS:                        11.4   8.98  )-----------( 227      ( 23 Jul 2018 08:25 )             34.0     07-23    141  |  104  |  5<L>  ----------------------------<  88  4.2   |  31  |  0.47<L>    Ca    8.5      23 Jul 2018 08:25      PTT - ( 22 Jul 2018 07:28 )  PTT:33.3 sec    CAPILLARY BLOOD GLUCOSE                MEDICATIONS  (STANDING):  cyanocobalamin 1000 MICROGram(s) Oral daily  escitalopram 15 milliGRAM(s) Oral at bedtime  lactobacillus acidophilus 1 Tablet(s) Oral three times a day with meals  loratadine 10 milliGRAM(s) Oral daily  phenazopyridine 100 milliGRAM(s) Oral every 8 hours  sodium chloride 0.9%. 1000 milliLiter(s) (100 mL/Hr) IV Continuous <Continuous>  traZODone 50 milliGRAM(s) Oral at bedtime  trimethoprim  160 mG/sulfamethoxazole 800 mG 1 Tablet(s) Oral every 12 hours  KATHY (oral contraceptive) Drosperi/ethiny 1 Tablet(s) 1 Tablet(s) Oral daily    MEDICATIONS  (PRN):  acetaminophen   Tablet 650 milliGRAM(s) Oral every 6 hours PRN For Temp greater than 38 C (100.4 F)  acetaminophen   Tablet. 500 milliGRAM(s) Oral daily PRN Mild Pain (1 - 3)  diphenhydrAMINE   Capsule 25 milliGRAM(s) Oral every 6 hours PRN Rash and/or Itching  fluticasone propionate 50 MICROgram(s)/spray Nasal Spray 1 Spray(s) Both Nostrils two times a day PRN nasal decongestant  HYDROmorphone  Injectable 0.5 milliGRAM(s) IV Push every 4 hours PRN severe pain or severe migraine  ketorolac   Injectable 10 milliGRAM(s) IV Push every 8 hours PRN headache  metoclopramide Injectable 10 milliGRAM(s) IV Push every 8 hours PRN headache  ondansetron Injectable 4 milliGRAM(s) IV Push every 6 hours PRN Nausea and/or Vomiting  oxyCODONE    5 mG/acetaminophen 325 mG 1 Tablet(s) Oral every 6 hours PRN moderate to severe pain (4-10)  pseudoephedrine 60 milliGRAM(s) Oral every 6 hours PRN sinus pressure      REVIEW OF SYSTEMS:  CONSTITUTIONAL: No fever, weight loss, or fatigue  EYES: No eye pain, visual disturbances, or discharge  ENMT:  No difficulty hearing, tinnitus, vertigo; No sinus or throat pain  NECK: No pain or stiffness  RESPIRATORY: No cough, wheezing, chills or hemoptysis; No shortness of breath  CARDIOVASCULAR: No chest pain, palpitations, dizziness, or leg swelling  GASTROINTESTINAL: No abdominal or epigastric pain. No nausea, vomiting, or hematemesis; No diarrhea or constipation. No melena or hematochezia.  GENITOURINARY: No dysuria, frequency, hematuria, or incontinence  NEUROLOGICAL: + headaches; no memory loss, loss of strength, numbness, or tremors  SKIN: No itching, burning, rashes, or lesions   LYMPH NODES: No enlarged glands  ENDOCRINE: No heat or cold intolerance; No hair loss  MUSCULOSKELETAL: No joint pain or swelling; No muscle, back, or extremity pain  PSYCHIATRIC: No depression, anxiety, mood swings, or difficulty sleeping  HEME/LYMPH: No easy bruising, or bleeding gums  ALLERY AND IMMUNOLOGIC: No hives or eczema    RADIOLOGY & ADDITIONAL TESTS:    EXAM:  CT BRAIN                          FINDINGS:      VENTRICLES AND SULCI:  Within normal limits for patient's age.  INTRA-AXIAL:  No acute intracranial hemorrhage, mass effect, or evidence   of acute territorial infarct.   EXTRA-AXIAL:  No mass or collection is seen.  VISUALIZED SINUSES:  No air-fluid levels.  VISUALIZED MASTOIDS:  Clear.  CALVARIUM:  Normal.  MISCELLANEOUS:  None.    IMPRESSION:    No acute intracranial hemorrhage, mass effect, or evidence of acute   territorial infarct.   If clinical symptoms persist, recommend follow-up imaging with MRI brain   if there are no contraindications.      Imaging Personally Reviewed:  [ ] YES  [ ] NO    Consultant(s) Notes Reviewed:  [ ] YES  [ ] NO    PHYSICAL EXAM:  GENERAL: NAD, well-groomed, well-developed  HEAD:  Atraumatic, normocephalic  EYES: EOMI, PERRLA, conjunctiva and sclera clear  ENMT: No tonsillar erythema, exudates, or enlargement; moist mucous membranes, good dentition, no lesions  NECK: Supple, No JVD, normal thyroid  NERVOUS SYSTEM:  Alert & Oriented X3, Good concentration; Motor Strength 5/5 B/L upper and lower extremities; DTRs 2+ intact and symmetric  CHEST/LUNG: Clear to auscultation bilaterally; no rales, rhonchi, wheezing, or rubs  HEART: Regular rate and rhythm; no murmurs, rubs, or gallops  ABDOMEN: Soft, nontender, nondistended; bowel sounds present  EXTREMITIES:  2+ Peripheral Pulses, no clubbing, cyanosis, or edema  LYMPH: No lymphadenopathy noted  SKIN: No rashes or lesions    Care Discussed with Consultants/Other Providers [ ] YES  [ ] NO Patient is a 28y old  Female who presents with a chief complaint of fever (20 Jul 2018 09:08)      INTERVAL HPI/OVERNIGHT EVENTS:  Patient seen and examined at bedside, states she feels much better. She still complains of headache, but noted improvement with Tylenol and IV tramadol. She denies relief with imitrex. Patient states muscle aches, abdominal pain, and nausea have resolved. She denies changes in her vision, chest pain, SOB, cough. Per ID, patient to be switched to PO Bactrim BID x 7 days for treatment of pyelo and UTI.    T(C): 36.7 (07-23-18 @ 05:23), Max: 37.3 (07-22-18 @ 22:58)  HR: 66 (07-23-18 @ 05:23) (54 - 69)  BP: 96/62 (07-23-18 @ 05:23) (96/62 - 111/75)  RR: 16 (07-23-18 @ 05:23) (16 - 16)  SpO2: 94% (07-23-18 @ 05:23) (94% - 94%)  Wt(kg): --  I&O's Summary      LABS:                        11.4   8.98  )-----------( 227      ( 23 Jul 2018 08:25 )             34.0     07-23    141  |  104  |  5<L>  ----------------------------<  88  4.2   |  31  |  0.47<L>    Ca    8.5      23 Jul 2018 08:25      PTT - ( 22 Jul 2018 07:28 )  PTT:33.3 sec    CAPILLARY BLOOD GLUCOSE                MEDICATIONS  (STANDING):  cyanocobalamin 1000 MICROGram(s) Oral daily  escitalopram 15 milliGRAM(s) Oral at bedtime  lactobacillus acidophilus 1 Tablet(s) Oral three times a day with meals  loratadine 10 milliGRAM(s) Oral daily  phenazopyridine 100 milliGRAM(s) Oral every 8 hours  sodium chloride 0.9%. 1000 milliLiter(s) (100 mL/Hr) IV Continuous <Continuous>  traZODone 50 milliGRAM(s) Oral at bedtime  trimethoprim  160 mG/sulfamethoxazole 800 mG 1 Tablet(s) Oral every 12 hours  KATHY (oral contraceptive) Drosperi/ethiny 1 Tablet(s) 1 Tablet(s) Oral daily    MEDICATIONS  (PRN):  acetaminophen   Tablet 650 milliGRAM(s) Oral every 6 hours PRN For Temp greater than 38 C (100.4 F)  acetaminophen   Tablet. 500 milliGRAM(s) Oral daily PRN Mild Pain (1 - 3)  diphenhydrAMINE   Capsule 25 milliGRAM(s) Oral every 6 hours PRN Rash and/or Itching  fluticasone propionate 50 MICROgram(s)/spray Nasal Spray 1 Spray(s) Both Nostrils two times a day PRN nasal decongestant  HYDROmorphone  Injectable 0.5 milliGRAM(s) IV Push every 4 hours PRN severe pain or severe migraine  ketorolac   Injectable 10 milliGRAM(s) IV Push every 8 hours PRN headache  metoclopramide Injectable 10 milliGRAM(s) IV Push every 8 hours PRN headache  ondansetron Injectable 4 milliGRAM(s) IV Push every 6 hours PRN Nausea and/or Vomiting  oxyCODONE    5 mG/acetaminophen 325 mG 1 Tablet(s) Oral every 6 hours PRN moderate to severe pain (4-10)  pseudoephedrine 60 milliGRAM(s) Oral every 6 hours PRN sinus pressure      REVIEW OF SYSTEMS:  CONSTITUTIONAL: No fever, weight loss, or fatigue  EYES: No eye pain, visual disturbances, or discharge  ENMT:  No difficulty hearing, tinnitus, vertigo; No sinus or throat pain  NECK: No pain or stiffness  RESPIRATORY: No cough, wheezing, chills or hemoptysis; No shortness of breath  CARDIOVASCULAR: No chest pain, palpitations, dizziness, or leg swelling  GASTROINTESTINAL: No abdominal or epigastric pain. No nausea, vomiting, or hematemesis; No diarrhea or constipation. No melena or hematochezia.  GENITOURINARY: No dysuria, frequency, hematuria, or incontinence  NEUROLOGICAL: + headaches; no memory loss, loss of strength, numbness, or tremors  SKIN: No itching, burning, rashes, or lesions   LYMPH NODES: No enlarged glands  ENDOCRINE: No heat or cold intolerance; No hair loss  MUSCULOSKELETAL: No joint pain or swelling; No muscle, back, or extremity pain  PSYCHIATRIC: No depression, anxiety, mood swings, or difficulty sleeping  HEME/LYMPH: No easy bruising, or bleeding gums  ALLERY AND IMMUNOLOGIC: No hives or eczema    RADIOLOGY & ADDITIONAL TESTS:    EXAM:  CT BRAIN                          FINDINGS:      VENTRICLES AND SULCI:  Within normal limits for patient's age.  INTRA-AXIAL:  No acute intracranial hemorrhage, mass effect, or evidence   of acute territorial infarct.   EXTRA-AXIAL:  No mass or collection is seen.  VISUALIZED SINUSES:  No air-fluid levels.  VISUALIZED MASTOIDS:  Clear.  CALVARIUM:  Normal.  MISCELLANEOUS:  None.    IMPRESSION:    No acute intracranial hemorrhage, mass effect, or evidence of acute   territorial infarct.   If clinical symptoms persist, recommend follow-up imaging with MRI brain   if there are no contraindications.      Imaging Personally Reviewed:  [ ] YES  [ ] NO    Consultant(s) Notes Reviewed:  [ ] YES  [ ] NO    PHYSICAL EXAM:  GENERAL: NAD, well-groomed, well-developed  HEAD:  Atraumatic, normocephalic  EYES: EOMI, PERRLA, conjunctiva and sclera clear  ENMT: No tonsillar erythema, exudates, or enlargement; moist mucous membranes, good dentition, no lesions  NECK: Supple, No JVD, normal thyroid  NERVOUS SYSTEM:  Alert & Oriented X3, Good concentration; Motor Strength 5/5 B/L upper and lower extremities; DTRs 2+ intact and symmetric  CHEST/LUNG: Clear to auscultation bilaterally; no rales, rhonchi, wheezing, or rubs  HEART: Regular rate and rhythm; no murmurs, rubs, or gallops  ABDOMEN: Soft, nontender, nondistended; bowel sounds present  EXTREMITIES:  2+ Peripheral Pulses, no clubbing, cyanosis, or edema  LYMPH: No lymphadenopathy noted  SKIN: No rashes or lesions    Care Discussed with Consultants/Other Providers [ ] YES  [ ] NO

## 2018-07-23 NOTE — PROGRESS NOTE ADULT - ASSESSMENT
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 Pt reports having a cystoscopy with biopsy 7 days ago. Admits to bladder pain/spasms on and off for the past one year and recurrent UTIs in the past. Pt states she has a tender "cyst" in her groin and vaginal discharge. Can not recall what color, no itching or order. Has been on Abx. Denies HA, CP, SOB, abd pain, dysuria, frequency.     In the ED, VS /64, , RR 16, Temp 100.5. Labs significant for Plt 71, UA positive nitrite and LEC. CXR negative. (18 Jul 2018 15:18)    Thrombocytopenia with history of ITP in the past. review of labs note plt ct 145K 9/2017.  exacerbation of ITP secondary to acute illness. no symptoms of active bleeding and is now normal  Headache being evaluated      Recommendations:   1.  continue present abx.   2.  follow CBC. with current platelet count does not require treatment for ITP and can observe. to hold steroids.   3.  continue present medical treatment. no further heme evaluation required at present. can follow plt count as an outpatient  please call if further evaluation required  thank you  discussed with patient and mother
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 Pt reports having a cystoscopy with biopsy 7 days ago. Admits to bladder pain/spasms on and off for the past one year and recurrent UTIs in the past. Pt states she has a tender "cyst" in her groin and vaginal discharge. Can not recall what color, no itching or order. Has been on Abx. Denies HA, CP, SOB, abd pain, dysuria, frequency.     In the ED, VS /64, , RR 16, Temp 100.5. Labs significant for Plt 71, UA positive nitrite and LEC. CXR negative. (18 Jul 2018 15:18)    Thrombocytopenia with history of ITP in the past. review of labs note plt ct 145K 9/2017.  exacerbation of ITP secondary to acute illness. no symptoms of active bleeding    Mild leukocytosis. Symptomatically feels better.      Recommendations:   1.  continue present abx. Check b12, folate. empiric PO B12.   2.  follow CBC. with current platelet count does not require treatment for ITP and can observe. to hold steroids. For high WBC, follow for now.   3.  discussed with patient.
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 Pt reports having a cystoscopy with biopsy 7 days ago. Admits to bladder pain/spasms on and off for the past one year and recurrent UTIs in the past. Pt states she has a tender "cyst" in her groin and vaginal discharge. Can not recall what color, no itching or order. Has been on Abx. Denies HA, CP, SOB, abd pain, dysuria, frequency.     In the ED, VS /64, , RR 16, Temp 100.5. Labs significant for Plt 71, UA positive nitrite and LEC. CXR negative. (18 Jul 2018 15:18)    Thrombocytopenia with history of ITP in the past. review of labs note plt ct 145K 9/2017.  exacerbation of ITP secondary to acute illness. no symptoms of active bleeding and is now improving    Mild leukocytosis. Symptomatically feels better.      Recommendations:   1.  continue present abx. Check b12, folate. empiric PO B12.   2.  follow CBC. with current platelet count does not require treatment for ITP and can observe. to hold steroids. For high WBC, follow for now.   3.  discussed with patient and Dr. Perlman
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

## 2018-07-23 NOTE — PROGRESS NOTE ADULT - PROBLEM SELECTOR PLAN 2
plt now improving  likely reactive 2/2 infection  f/u CBC in am
Plt 64, in 2017 Plt 145  likely reactive 2/2 infection  f/u CBC in am
All cultures here have been negative to date, yet pt continues to have fevers. CT shows nml upper tracts. Therefore it is unlikely that her fevers are coming from a uti. We will f/u here as needed, pt will see her  after d/c.
given her fevers, will order CT [place]
platelets now improving  likely reactive 2/2 infection  f/u CBC in am
plt now improving  likely reactive 2/2 infection  f/u CBC in am

## 2018-07-23 NOTE — PROGRESS NOTE ADULT - PROBLEM SELECTOR PLAN 4
Gyn Dr Maloney consulted   white vaginal disharge - resolved  Based on history of near-continuous antibiotic usage, treated for yeast infection presumptively  Diflucan 200 mg x 3 days - completed

## 2018-07-23 NOTE — PROGRESS NOTE ADULT - PROBLEM SELECTOR PROBLEM 2
ITP secondary to infection
UTI (urinary tract infection)
ITP secondary to infection
UTI (urinary tract infection)
UTI (urinary tract infection)
Flank pain
ITP secondary to infection
UTI (urinary tract infection)
ITP secondary to infection

## 2018-07-23 NOTE — PROGRESS NOTE ADULT - PROVIDER SPECIALTY LIST ADULT
GYN
Heme/Onc
Heme/Onc
Infectious Disease
Internal Medicine
Neurology
Neurology
Urology
Urology
Internal Medicine
Heme/Onc
Internal Medicine

## 2018-07-23 NOTE — PROGRESS NOTE ADULT - SUBJECTIVE AND OBJECTIVE BOX
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 Pt reports having a cystoscopy with biopsy 7 days ago. Admits to bladder pain/spasms on and off for the past one year and recurrent UTIs in the past. Pt states she has a tender "cyst" in her groin and vaginal discharge. Can not recall what color, no itching or order. Has been on Abx. Denies HA, CP, SOB, abd pain, dysuria, frequency.     In the ED, VS /64, , RR 16, Temp 100.5. Labs significant for Plt 71, UA positive nitrite and LEC. CXR negative. (18 Jul 2018 15:18)      Interval History - ha much improved.    Patient is seen, chart was reviewed and case was discussed with the treatment team.  Pt is not in any distress.   Lying on bed comfortably.   No events reported overnight.   No clinical seizure was reported.  Sitting on chair bed comfortably.    is at bedside.    Vital Signs Last 24 Hrs  T(C): 36.7 (23 Jul 2018 05:23), Max: 37.3 (22 Jul 2018 22:58)  T(F): 98.1 (23 Jul 2018 05:23), Max: 99.1 (22 Jul 2018 22:58)  HR: 66 (23 Jul 2018 05:23) (54 - 69)  BP: 96/62 (23 Jul 2018 05:23) (96/62 - 111/75)  BP(mean): --  RR: 16 (23 Jul 2018 05:23) (16 - 16)  SpO2: 94% (23 Jul 2018 05:23) (94% - 94%)        REVIEW OF SYSTEMS:    Constitutional: No fever, weight loss or fatigue  Eyes: No eye pain, visual disturbances, or discharge  ENT:  No difficulty hearing, tinnitus, vertigo; No sinus or throat pain  Neck: No pain or stiffness  Respiratory: No cough, wheezing, chills or hemoptysis  Cardiovascular: No chest pain, palpitations, shortness of breath, dizziness or leg swelling  Gastrointestinal: No abdominal or epigastric pain. No nausea, vomiting or hematemesis; No diarrhea or constipation. No melena or hematochezia.  Genitourinary: No dysuria, frequency, hematuria or incontinence  Neurological: No memory loss, loss of strength, numbness or tremors  Psychiatric: No depression, anxiety, mood swings or difficulty sleeping  Musculoskeletal: No joint pain or swelling; No muscle, back or extremity pain  Skin: No itching, burning, rashes or lesions   Lymph Nodes: No enlarged glands  Endocrine: No heat or cold intolerance; No hair loss, No h/o diabetes or thyroid dysfunction  Allergy and Immunologic: No hives or eczema    On Neurological Examination:    Mental Status - Pt is alert, awake, oriented X3. Follows commands well and able to answer questions appropriately  .Mood and affect  normal    Speech -  Normal.     Cranial Nerves - Pupils 3 mm equal and reactive to light, extraocular eye movements intact. Pt has no visual field deficit.  Pt has no  facial asymmetry. Facial sensation is intact.Tongue - is in midline.    Muscle tone - is normal all over. Moves all extremities equally. No asymmetry is seen.      Motor Exam - 5/5 all over, No drift. No shaking or tremors.    Sensory Exam - Pin prick, temperature, joint position and vibration are intact on either side. Pt withdraws all extremities equally on stimulation. No asymmetry seen. No complaints of tingling, numbness.    Gait - Able to stand and walk unassisted.        coordination:    Finger to nose: normal/      Deep tendon Reflexes - 2 plus all over.        Romberg - Negative.    Neck Supple -  Yes.     MEDICATIONS    acetaminophen   Tablet 650 milliGRAM(s) Oral every 6 hours PRN  acetaminophen   Tablet. 500 milliGRAM(s) Oral daily PRN  cyanocobalamin 1000 MICROGram(s) Oral daily  diphenhydrAMINE   Capsule 25 milliGRAM(s) Oral every 6 hours PRN  escitalopram 15 milliGRAM(s) Oral at bedtime  fluticasone propionate 50 MICROgram(s)/spray Nasal Spray 1 Spray(s) Both Nostrils two times a day PRN  HYDROmorphone  Injectable 0.5 milliGRAM(s) IV Push every 4 hours PRN  ketorolac   Injectable 10 milliGRAM(s) IV Push every 8 hours PRN  lactobacillus acidophilus 1 Tablet(s) Oral three times a day with meals  loratadine 10 milliGRAM(s) Oral daily  metoclopramide Injectable 10 milliGRAM(s) IV Push every 8 hours PRN  ondansetron Injectable 4 milliGRAM(s) IV Push every 6 hours PRN  oxyCODONE    5 mG/acetaminophen 325 mG 1 Tablet(s) Oral every 6 hours PRN  phenazopyridine 100 milliGRAM(s) Oral every 8 hours  pseudoephedrine 60 milliGRAM(s) Oral every 6 hours PRN  sodium chloride 0.9%. 1000 milliLiter(s) IV Continuous <Continuous>  traZODone 50 milliGRAM(s) Oral at bedtime  trimethoprim  160 mG/sulfamethoxazole 800 mG 1 Tablet(s) Oral every 12 hours  KATHY (oral contraceptive) Drosperi/ethiny 1 Tablet(s) 1 Tablet(s) Oral daily      Allergies    cefaclor (Hives)    Intolerances        LABS:  CBC Full  -  ( 23 Jul 2018 08:25 )  WBC Count : 8.98 K/uL  Hemoglobin : 11.4 g/dL  Hematocrit : 34.0 %  Platelet Count - Automated : 227 K/uL  Mean Cell Volume : 83.7 fl  Mean Cell Hemoglobin : 28.1 pg  Mean Cell Hemoglobin Concentration : 33.5 gm/dL  Auto Neutrophil # : x  Auto Lymphocyte # : x  Auto Monocyte # : x  Auto Eosinophil # : x  Auto Basophil # : x  Auto Neutrophil % : x  Auto Lymphocyte % : x  Auto Monocyte % : x  Auto Eosinophil % : x  Auto Basophil % : x      07-23    141  |  104  |  5<L>  ----------------------------<  88  4.2   |  31  |  0.47<L>    Ca    8.5      23 Jul 2018 08:25      Hemoglobin A1C:     Vitamin B12     RADIOLOGY    ASSESSMENT AND PLAN:      headache likely migraine improving with iv toradol and reglan.    patient wants to go home.  unable to get brain mri-mra in- patient as mri machine out of order.  would get brain mri as out patient if headache persists.  I have provided patient script of mri.  Pain is accessed and addressed.  Plan of care was discussed with family. Questions answered.

## 2018-07-24 LAB
WNV AB SPEC QL: SIGNIFICANT CHANGE UP
WNV IGG TITR FLD: NEGATIVE — SIGNIFICANT CHANGE UP
WNV IGM SPEC QL: NEGATIVE — SIGNIFICANT CHANGE UP

## 2018-07-25 PROBLEM — G47.00 INSOMNIA, UNSPECIFIED: Chronic | Status: ACTIVE | Noted: 2018-07-18

## 2018-07-25 PROBLEM — D69.59 OTHER SECONDARY THROMBOCYTOPENIA: Chronic | Status: ACTIVE | Noted: 2018-07-18

## 2018-07-25 PROBLEM — N30.10 INTERSTITIAL CYSTITIS (CHRONIC) WITHOUT HEMATURIA: Chronic | Status: ACTIVE | Noted: 2018-07-18

## 2018-07-25 PROBLEM — F32.9 MAJOR DEPRESSIVE DISORDER, SINGLE EPISODE, UNSPECIFIED: Chronic | Status: ACTIVE | Noted: 2018-07-18

## 2018-07-27 ENCOUNTER — APPOINTMENT (OUTPATIENT)
Dept: INTERNAL MEDICINE | Facility: CLINIC | Age: 28
End: 2018-07-27

## 2018-07-28 ENCOUNTER — APPOINTMENT (OUTPATIENT)
Dept: MRI IMAGING | Facility: CLINIC | Age: 28
End: 2018-07-28

## 2018-09-20 ENCOUNTER — APPOINTMENT (OUTPATIENT)
Dept: UROGYNECOLOGY | Facility: CLINIC | Age: 28
End: 2018-09-20
Payer: COMMERCIAL

## 2018-09-20 VITALS
HEART RATE: 69 BPM | HEIGHT: 63 IN | SYSTOLIC BLOOD PRESSURE: 110 MMHG | DIASTOLIC BLOOD PRESSURE: 77 MMHG | WEIGHT: 120 LBS | BODY MASS INDEX: 21.26 KG/M2

## 2018-09-20 DIAGNOSIS — Z83.49 FAMILY HISTORY OF OTHER ENDOCRINE, NUTRITIONAL AND METABOLIC DISEASES: ICD-10-CM

## 2018-09-20 DIAGNOSIS — Z83.1 FAMILY HISTORY OF OTHER INFECTIOUS AND PARASITIC DISEASES: ICD-10-CM

## 2018-09-20 DIAGNOSIS — Z82.5 FAMILY HISTORY OF ASTHMA AND OTHER CHRONIC LOWER RESPIRATORY DISEASES: ICD-10-CM

## 2018-09-20 DIAGNOSIS — Z81.8 FAMILY HISTORY OF OTHER MENTAL AND BEHAVIORAL DISORDERS: ICD-10-CM

## 2018-09-20 DIAGNOSIS — M62.89 OTHER SPECIFIED DISORDERS OF MUSCLE: ICD-10-CM

## 2018-09-20 DIAGNOSIS — Z84.1 FAMILY HISTORY OF DISORDERS OF KIDNEY AND URETER: ICD-10-CM

## 2018-09-20 DIAGNOSIS — Z80.3 FAMILY HISTORY OF MALIGNANT NEOPLASM OF BREAST: ICD-10-CM

## 2018-09-20 DIAGNOSIS — Z83.79 FAMILY HISTORY OF OTHER DISEASES OF THE DIGESTIVE SYSTEM: ICD-10-CM

## 2018-09-20 DIAGNOSIS — Z78.9 OTHER SPECIFIED HEALTH STATUS: ICD-10-CM

## 2018-09-20 LAB
BILIRUB UR QL STRIP: NORMAL
CLARITY UR: CLEAR
COLLECTION METHOD: NORMAL
GLUCOSE UR-MCNC: NORMAL
HCG UR QL: 0.2 EU/DL
HGB UR QL STRIP.AUTO: NORMAL
KETONES UR-MCNC: NORMAL
LEUKOCYTE ESTERASE UR QL STRIP: NORMAL
NITRITE UR QL STRIP: NORMAL
PH UR STRIP: 7
PROT UR STRIP-MCNC: NORMAL
SP GR UR STRIP: 1

## 2018-09-20 PROCEDURE — 51725 SIMPLE CYSTOMETROGRAM: CPT

## 2018-09-20 PROCEDURE — 51741 ELECTRO-UROFLOWMETRY FIRST: CPT

## 2018-09-20 PROCEDURE — 99204 OFFICE O/P NEW MOD 45 MIN: CPT | Mod: 25

## 2018-09-21 ENCOUNTER — RESULT REVIEW (OUTPATIENT)
Age: 28
End: 2018-09-21

## 2018-09-21 PROBLEM — Z78.9 RARELY CONSUMES ALCOHOL: Status: ACTIVE | Noted: 2018-09-21

## 2018-09-21 PROBLEM — Z83.1 FAMILY HISTORY OF PNEUMONIA: Status: ACTIVE | Noted: 2018-09-21

## 2018-09-21 PROBLEM — Z83.49 FAMILY HISTORY OF OBESITY: Status: ACTIVE | Noted: 2018-09-21

## 2018-09-21 PROBLEM — Z80.3 FAMILY HISTORY OF MALIGNANT NEOPLASM OF BREAST: Status: ACTIVE | Noted: 2018-09-21

## 2018-09-21 PROBLEM — Z83.49 FAMILY HISTORY OF THYROID DISEASE: Status: ACTIVE | Noted: 2018-09-21

## 2018-09-21 PROBLEM — Z81.8 FAMILY HISTORY OF DEPRESSION: Status: ACTIVE | Noted: 2018-09-21

## 2018-09-21 PROBLEM — Z84.1 FAMILY HISTORY OF KIDNEY DISEASE: Status: ACTIVE | Noted: 2018-09-21

## 2018-09-21 PROBLEM — Z83.79 FAMILY HISTORY OF LIVER DISEASE: Status: ACTIVE | Noted: 2018-09-21

## 2018-09-21 PROBLEM — Z82.5 FAMILY HISTORY OF ASTHMA: Status: ACTIVE | Noted: 2017-01-31

## 2018-09-21 LAB
APPEARANCE: CLEAR
BACTERIA: NEGATIVE
BILIRUBIN URINE: NEGATIVE
BLOOD URINE: NEGATIVE
COLOR: YELLOW
GLUCOSE QUALITATIVE U: NEGATIVE MG/DL
KETONES URINE: NEGATIVE
LEUKOCYTE ESTERASE URINE: NEGATIVE
MICROSCOPIC-UA: NORMAL
NITRITE URINE: NEGATIVE
PH URINE: 7
PROTEIN URINE: NEGATIVE MG/DL
RED BLOOD CELLS URINE: 0 /HPF
SPECIFIC GRAVITY URINE: 1
SQUAMOUS EPITHELIAL CELLS: 0 /HPF
UROBILINOGEN URINE: NEGATIVE MG/DL
WHITE BLOOD CELLS URINE: 0 /HPF

## 2018-09-21 RX ORDER — PHENAZOPYRIDINE HYDROCHLORIDE 200 MG/1
200 TABLET ORAL
Qty: 10 | Refills: 0 | Status: DISCONTINUED | COMMUNITY
Start: 2018-09-07

## 2018-09-21 RX ORDER — AMOXICILLIN 500 MG/1
500 CAPSULE ORAL
Qty: 15 | Refills: 0 | Status: DISCONTINUED | COMMUNITY
Start: 2018-09-07

## 2018-09-24 ENCOUNTER — RECORD ABSTRACTING (OUTPATIENT)
Age: 28
End: 2018-09-24

## 2018-09-24 ENCOUNTER — RESULT REVIEW (OUTPATIENT)
Age: 28
End: 2018-09-24

## 2018-09-24 ENCOUNTER — MESSAGE (OUTPATIENT)
Age: 28
End: 2018-09-24

## 2018-09-24 LAB — BACTERIA UR CULT: NORMAL

## 2018-09-25 ENCOUNTER — RECORD ABSTRACTING (OUTPATIENT)
Age: 28
End: 2018-09-25

## 2018-09-25 DIAGNOSIS — N94.10 UNSPECIFIED DYSPAREUNIA: ICD-10-CM

## 2018-09-25 DIAGNOSIS — Z84.89 FAMILY HISTORY OF OTHER SPECIFIED CONDITIONS: ICD-10-CM

## 2018-09-25 DIAGNOSIS — N32.89 OTHER SPECIFIED DISORDERS OF BLADDER: ICD-10-CM

## 2018-09-25 DIAGNOSIS — R10.2 PELVIC AND PERINEAL PAIN: ICD-10-CM

## 2018-09-25 DIAGNOSIS — Z83.3 FAMILY HISTORY OF DIABETES MELLITUS: ICD-10-CM

## 2018-09-25 DIAGNOSIS — Z86.79 PERSONAL HISTORY OF OTHER DISEASES OF THE CIRCULATORY SYSTEM: ICD-10-CM

## 2018-09-25 DIAGNOSIS — E87.70 FLUID OVERLOAD, UNSPECIFIED: ICD-10-CM

## 2018-09-25 DIAGNOSIS — R31.29 OTHER MICROSCOPIC HEMATURIA: ICD-10-CM

## 2018-09-25 DIAGNOSIS — Z87.440 PERSONAL HISTORY OF URINARY (TRACT) INFECTIONS: ICD-10-CM

## 2018-09-25 DIAGNOSIS — Z87.09 PERSONAL HISTORY OF OTHER DISEASES OF THE RESPIRATORY SYSTEM: ICD-10-CM

## 2018-09-25 RX ORDER — ESCITALOPRAM OXALATE 5 MG/1
5 TABLET, FILM COATED ORAL
Refills: 0 | Status: ACTIVE | COMMUNITY

## 2018-10-11 ENCOUNTER — MESSAGE (OUTPATIENT)
Age: 28
End: 2018-10-11

## 2018-10-18 ENCOUNTER — APPOINTMENT (OUTPATIENT)
Dept: UROGYNECOLOGY | Facility: CLINIC | Age: 28
End: 2018-10-18
Payer: COMMERCIAL

## 2018-10-18 PROCEDURE — 99214 OFFICE O/P EST MOD 30 MIN: CPT

## 2018-10-18 RX ORDER — CYCLOBENZAPRINE HYDROCHLORIDE 10 MG/1
10 TABLET, FILM COATED ORAL
Qty: 90 | Refills: 0 | Status: ACTIVE | COMMUNITY
Start: 2018-10-18 | End: 1900-01-01

## 2018-12-18 ENCOUNTER — APPOINTMENT (OUTPATIENT)
Dept: UROGYNECOLOGY | Facility: CLINIC | Age: 28
End: 2018-12-18
Payer: COMMERCIAL

## 2018-12-18 PROCEDURE — 99214 OFFICE O/P EST MOD 30 MIN: CPT

## 2018-12-21 RX ORDER — DIAZEPAM 100 %
POWDER (GRAM) MISCELLANEOUS
Qty: 30 | Refills: 0 | Status: ACTIVE | COMMUNITY
Start: 2018-09-20 | End: 1900-01-01

## 2019-01-03 ENCOUNTER — APPOINTMENT (OUTPATIENT)
Dept: UROGYNECOLOGY | Facility: CLINIC | Age: 29
End: 2019-01-03
Payer: COMMERCIAL

## 2019-01-03 LAB
BILIRUB UR QL STRIP: NORMAL
CLARITY UR: CLEAR
COLLECTION METHOD: NORMAL
GLUCOSE UR-MCNC: NORMAL
HCG UR QL: 0.2 EU/DL
HGB UR QL STRIP.AUTO: NORMAL
KETONES UR-MCNC: NORMAL
LEUKOCYTE ESTERASE UR QL STRIP: NORMAL
NITRITE UR QL STRIP: NORMAL
PH UR STRIP: 7
PROT UR STRIP-MCNC: NORMAL
SP GR UR STRIP: 1.01

## 2019-01-03 PROCEDURE — 99214 OFFICE O/P EST MOD 30 MIN: CPT

## 2019-01-07 ENCOUNTER — RESULT REVIEW (OUTPATIENT)
Age: 29
End: 2019-01-07

## 2019-01-22 ENCOUNTER — APPOINTMENT (OUTPATIENT)
Dept: HUMAN REPRODUCTION | Facility: CLINIC | Age: 29
End: 2019-01-22

## 2019-01-23 ENCOUNTER — APPOINTMENT (OUTPATIENT)
Dept: UROGYNECOLOGY | Facility: HOSPITAL | Age: 29
End: 2019-01-23

## 2019-02-07 ENCOUNTER — APPOINTMENT (OUTPATIENT)
Dept: UROGYNECOLOGY | Facility: CLINIC | Age: 29
End: 2019-02-07

## 2019-02-14 ENCOUNTER — APPOINTMENT (OUTPATIENT)
Dept: UROGYNECOLOGY | Facility: CLINIC | Age: 29
End: 2019-02-14

## 2019-09-28 NOTE — ED ADULT NURSE NOTE - RN DISCHARGE SIGNATURE
Patient:   RAMIREZ BOWIE            MRN: [id number]            FIN: [id number]               Age:   29 years     Sex:  MALE     :  84   Associated Diagnoses:   None   Author:   MANUELA, EMBER      DISCHARGE SUMMARY      CHIEF COMPLAINT:  Palpitations.    HISTORY OF PRESENT ILLNESS:  This is a 29-year-old male presented to [facility] ER for evaluation due to suddent onset of palpitations earlier today while excercising. Patient was diagnosed with A.Fib and undergone cardioversion in 2017. Denies CP. No fevers. No recent travel or sick contacts exposure. Initial care provided by ER MD and patient was admitted for further treatment.     PAST MEDICAL HISTORY:  HTN. Cardiomyopathy. Systolic CHF. Pulmonary edema. Alcoholism. Pneumonia. Superficial thrombophlebitis.     PAST SURGICAL HISTORY:  Cardioversion. Reconstruction of jaw using free vascular bone flap. Surgical removal of abd skin.    ALLERGIES:  NKDA.    MEDICATIONS:  Home Medications (3) Active  lisinopril oral 5 mg tablet 5 mg = 1 tab, Oral, Daily  sotalol oral 80 mg tablet 80 mg = 1 tab, Oral, Q12H  Xarelto oral 20 mg tablet 20 mg = 1 tab, Oral, Daily [with dinner]    FAMILY HISTORY:  Father - DM, Colon CA, Prostate CA.    SOCIAL HISTORY:  The patient lives at home. Denies recent tobacco, alcohol or illegal substance use.    REVIEW OF SYSTEMS:   GENERAL: No weight changes. No fevers or chills.  EYES: No pain or injuries.    ENT: No head injuries, epistaxis, sinus problems.    ENDOCRINE: No polyuria, polydipsia, heat or cold intolerance, excessive sweating.    HEM-LYMPH: No swollen masses or nodes, excessive bruising or bleeding.    CV: No Chest pains. + Palpitations.    RESPIRATORY: No shortness of breath, no dyspnea on exertion, no cough, no hemoptysis.  GI: Good appetite and oral intake of food and fluids. No abdominal painse, no constipation. No nausea, vomiting, no melena, no hematemesis.  : No dysuria, hematuria, or flank pains.     SKIN: No rashes or itching.    M-SK: No muscles or joint pains.    NEURO: No weakness, no seizures, no loss of consciousness, no syncope.    PSYCH: No declining memory. No depression. No agitation episodes.    PHYSICAL EXAMINATION:  Vitals between:   10-PATRICIO-2018 16:09:22   TO   11-JAN-2018 16:09:22                   LAST RESULT MINIMUM MAXIMUM  Temperature  36.9  36.8  36.9  Heart Rate  62  52  70  Respiratory Rate 16  16  16  NISBP           102  94  105  NIDBP           56  56  65  NIMBP           71  71  71  SpO2                    98  98  99  GENERAL: NAD. No acute respiratory distress.  EYES: PERRLA. No discharges. Sclerae anicteric. EOMI.  ENT: Atraumatic. Mucosa clear, dry. No epistaxis.  NECK: Supple. No meningeal signs. Trachea midline.  ENDOCRINE: No gross goiter, exophthalmos, excessive sweating.  HEM-LYMPH: No submandibular/supraclavicular adenopathy bilaterally. No lymphangitis, excessive bruising.  CARDIOVASCULAR: RRR. S1,S2. No JVD.  LUNGS: CTA-B.  ABDOMEN: Soft, nondistended, nontender, + bowel sounds. No guarding or rebound.  GENITOURINARY: No suprapubic or flank tenderness bilaterally.  EXTREMITIES: No edema. No calf tenderness. Negative Homans sign bilaterally.  SKIN: Visible skin areas revealed no rashes, hemorrhages, petechiae, ecchymosis, or jaundice.  MUSCULOSKELETAL: No atrophy, contractures, or effusion in the joints.  NEUROLOGIC: Grossly no focal signs. No weakness. No evidence of seizures at time of exam. Reflexes, gait, cerebellar function not tested at the time of exam.  PSYCHIATRIC: Awake. A&O x 3. Not agitated.    LABORATORY AND DIAGNOSTIC TESTS:  Labs between:  10-PATRICIO-2018 16:10 to 11-JAN-2018 16:10    BMP:                 Na  Cl  BUN  Glu   11-JAN-2018  139  104  8  89                              K  CO2  Cr  Ca                              3.8  26  0.82  8.6     Other Chem:             Mg  Phos  Triglycerides  GGTP  DirectBili                           2.1                         Labs between:  09-JAN-2018 18:31 to 10-PATRICIO-2018 18:31    CBC:                 WBC  HgB  Hct  Plt  MCV  RDW   10-PATRICIO-2018  6.4  15.8  45.1  193  89.7  12.6     DIFF:                 Seg  Neutroph//ABS  Lymph//ABS  Mono//ABS  EOS/ABS   10-PATRICIO-2018 NOT APPLICABLE  56 // 3.6 28 // 1.8 12 // 0.8 3 // 0.2    BMP:                 Na  Cl  BUN  Glu   10-PATRICIO-2018  143  106  8  91                              K  CO2  Cr  Ca                              4.5  23  0.88  8.8     Other Chem:             Mg  Phos  Triglycerides  GGTP  DirectBili                           1.9             Result title:  XR CHEST PORTABLE 1V  Result status:  Final  Verified by:  HIRAM IRAHETA on 01/08/2018 6:22  IMPRESSION:1.  Stable enlarged heart size.2.  Mild hypoinflation.  No focal consolidation.     2D ECHO             STUDY CONCLUSIONS SUMMARY:  1. Left ventricle: The cavity size is normal. Systolic function is mildly to moderately reduced. The estimated ejection fraction is 40%.  2. Left atrium: No evidence of thrombus in the atrial cavity or appendage.  3. Atrial septum: Echo contrast study shows no shunt.      ASSESSMENT/HOSPITAL COURSE/PLAN:  - PAF with RVR/Cardiomyopathy/Systolic CHF(stable now) - proceeded with temetry unit care. Cardioversion completed by Cardiology. Proceeded with Cardiology managment. Proceeded with anticoagulation Tx per Cardiology. 2D ECHO completed.  - HTN - monitored BP/HR and adjusted Rx prn.   - Alcoholism(stable now) - monitored. No Tx required.    Other/General:  - GI prophylaxis: none.  - DVT prophylaxis: Lovenox SQ/Xarelto PO.      - Code status: FULL.    Discussed plan of care with Cardiology attn, RN and the patient. Discharged home in stable condition per Cardiology clearance on 01/11/18.      Ember Valentin MD            Electronically Signed On 01/11/2018 16:14  __________________________________________________   MANUELA, EMBER     04-Sep-2017

## 2019-11-20 ENCOUNTER — APPOINTMENT (OUTPATIENT)
Dept: UROGYNECOLOGY | Facility: CLINIC | Age: 29
End: 2019-11-20

## 2019-11-21 LAB — BACTERIA UR CULT: NORMAL

## 2019-12-25 PROBLEM — R10.2 PELVIC PAIN IN FEMALE: Status: ACTIVE | Noted: 2018-09-25

## 2022-10-12 ENCOUNTER — NON-APPOINTMENT (OUTPATIENT)
Age: 32
End: 2022-10-12

## 2023-08-17 ENCOUNTER — OFFICE (OUTPATIENT)
Dept: URBAN - METROPOLITAN AREA CLINIC 104 | Facility: CLINIC | Age: 33
Setting detail: OPHTHALMOLOGY
End: 2023-08-17
Payer: COMMERCIAL

## 2023-08-17 DIAGNOSIS — Z79.899: ICD-10-CM

## 2023-08-17 DIAGNOSIS — H01.001: ICD-10-CM

## 2023-08-17 DIAGNOSIS — H35.361: ICD-10-CM

## 2023-08-17 DIAGNOSIS — H01.004: ICD-10-CM

## 2023-08-17 PROCEDURE — 92014 COMPRE OPH EXAM EST PT 1/>: CPT | Performed by: OPTOMETRIST

## 2023-08-17 PROCEDURE — 92250 FUNDUS PHOTOGRAPHY W/I&R: CPT | Performed by: OPTOMETRIST

## 2023-08-17 ASSESSMENT — VISUAL ACUITY
OD_BCVA: 20/20-2
OS_BCVA: 20/20-2

## 2023-08-17 ASSESSMENT — LID EXAM ASSESSMENTS
OD_BLEPHARITIS: RUL 1+
OS_BLEPHARITIS: LUL 1+

## 2023-08-17 ASSESSMENT — KERATOMETRY
OD_AXISANGLE_DEGREES: 098
OS_AXISANGLE_DEGREES: 088
OS_K2POWER_DIOPTERS: 44.94
OD_K2POWER_DIOPTERS: 44.82
OS_K1POWER_DIOPTERS: 44.35
OD_K1POWER_DIOPTERS: 44.23

## 2023-08-17 ASSESSMENT — REFRACTION_AUTOREFRACTION
OS_SPHERE: PLANO
OD_SPHERE: PLANO

## 2023-08-17 ASSESSMENT — CONFRONTATIONAL VISUAL FIELD TEST (CVF)
OS_FINDINGS: FULL
OD_FINDINGS: FULL

## 2023-08-17 ASSESSMENT — TONOMETRY
OD_IOP_MMHG: 17
OS_IOP_MMHG: 16

## 2023-09-05 ENCOUNTER — APPOINTMENT (OUTPATIENT)
Dept: ORTHOPEDIC SURGERY | Facility: CLINIC | Age: 33
End: 2023-09-05
Payer: COMMERCIAL

## 2023-09-05 ENCOUNTER — NON-APPOINTMENT (OUTPATIENT)
Age: 33
End: 2023-09-05

## 2023-09-05 VITALS — WEIGHT: 118 LBS | BODY MASS INDEX: 20.91 KG/M2 | HEIGHT: 63 IN

## 2023-09-05 DIAGNOSIS — M22.2X2 PATELLOFEMORAL DISORDERS, RIGHT KNEE: ICD-10-CM

## 2023-09-05 DIAGNOSIS — M22.2X1 PATELLOFEMORAL DISORDERS, RIGHT KNEE: ICD-10-CM

## 2023-09-05 PROCEDURE — 73562 X-RAY EXAM OF KNEE 3: CPT | Mod: 50

## 2023-09-05 PROCEDURE — 99203 OFFICE O/P NEW LOW 30 MIN: CPT

## 2023-09-05 NOTE — DISCUSSION/SUMMARY
[de-identified] : Bilateral knee patellofemoral pain quadriceps and core weakness.  Given prescription for physical therapy home exercise program.  Follow-up as needed.  All questions answered

## 2023-09-05 NOTE — PHYSICAL EXAM
[de-identified] : General Exam  Well developed, well nourished No apparent distress Oriented to person, place, and time Mood: Normal Affect: Normal Balance and coordination: Normal Gait: Normal  left knee exam  Skin: Clean, dry, intact Inspection: No obvious malalignment, no masses, no swelling, no effusion. Tenderness: no MJLT. No LJLT. + tenderness over the medial and lateral patella facets. No ttp medial/lateral epicondyle, patella tendon, tibial tubercle, pes anserinus, or proximal fibula. ROM: 0 to 140 no pain with deep flexion Stability: Stable to varus, valgus, lachman testing. Negative anterior/posterior drawer. Additional tests: Negative McMurrays test, Negative patellar grind test.  Strength: 5/5 Q/H/TA/GS/EHL, no atrophy Neuro: In tact to light touch throughout in dp/sp/tib/wagner/saph nerve districutions, DTR's normal Pulses: 2+ DP/PT pulses.  right knee exam  Skin: Clean, dry, intact Inspection: No obvious malalignment, no masses, no swelling, no effusion. Tenderness: no MJLT. No LJLT. + tenderness over the medial and lateral patella facets. No ttp medial/lateral epicondyle, patella tendon, tibial tubercle, pes anserinus, or proximal fibula. ROM: 0 to 140 no pain with deep flexion  Stability: Stable to varus, valgus, lachman testing. Negative anterior/posterior drawer. Additional tests: Negative McMurrays test, Negative patellar grind test.  Strength: 5/5 Q/H/TA/GS/EHL, no atrophy Neuro: In tact to light touch throughout in dp/sp/tib/awgner/saph nerve districutions, DTR's normal Pulses: 2+ DP/PT pulses. [de-identified] :  The following radiographs were ordered and read by me during this patients visit. I reviewed each radiograph in detail with the patient and discussed the findings as highlighted below.   3 views both knees] were obtained today that show no fracture, dislocation. There is no degenerative change seen. There is no malalignment. No obvious osseous abnormality. Otherwise unremarkable.

## 2023-09-05 NOTE — HISTORY OF PRESENT ILLNESS
[de-identified] : 42yo female presents complaining of bilateral anterior knee pain equal intensity for about 9 to 10 months.  She states she has 2 children constantly kneeling, using stairs.  Pain worse with deep knee flexion and attempting to extend.  No injuries or trauma.  No swelling.  Pain is intermittent.  Overall today she feels okay.  Denies any instability, mechanical symptoms.  Denies numbness tingling  The patient's past medical history, past surgical history, medications, allergies, and social history were reviewed by me today with the patient and documented accordingly. In addition, the patient's family history, which is noncontributory to this visit, was also reviewed.

## 2023-10-26 ENCOUNTER — OFFICE (OUTPATIENT)
Dept: URBAN - METROPOLITAN AREA CLINIC 104 | Facility: CLINIC | Age: 33
Setting detail: OPHTHALMOLOGY
End: 2023-10-26
Payer: COMMERCIAL

## 2023-10-26 DIAGNOSIS — Z79.899: ICD-10-CM

## 2023-10-26 PROCEDURE — 92012 INTRM OPH EXAM EST PATIENT: CPT | Performed by: OPTOMETRIST

## 2023-10-26 PROCEDURE — 92083 EXTENDED VISUAL FIELD XM: CPT | Performed by: OPTOMETRIST

## 2023-10-26 ASSESSMENT — CONFRONTATIONAL VISUAL FIELD TEST (CVF)
OS_FINDINGS: FULL
OD_FINDINGS: FULL

## 2023-10-26 ASSESSMENT — KERATOMETRY
OD_K2POWER_DIOPTERS: 44.82
OS_AXISANGLE_DEGREES: 085
OS_K2POWER_DIOPTERS: 45.00
OS_K1POWER_DIOPTERS: 44.35
OD_AXISANGLE_DEGREES: 107
OD_K1POWER_DIOPTERS: 44.29

## 2023-10-26 ASSESSMENT — LID EXAM ASSESSMENTS
OS_BLEPHARITIS: LUL 1+
OD_BLEPHARITIS: RUL 1+

## 2023-10-26 ASSESSMENT — REFRACTION_AUTOREFRACTION
OD_SPHERE: +0.25
OS_SPHERE: +0.25

## 2023-10-26 ASSESSMENT — TONOMETRY
OS_IOP_MMHG: 16
OD_IOP_MMHG: 16

## 2023-10-26 ASSESSMENT — VISUAL ACUITY
OS_BCVA: 20/20
OD_BCVA: 20/20

## 2024-02-08 ENCOUNTER — NON-APPOINTMENT (OUTPATIENT)
Age: 34
End: 2024-02-08

## 2024-04-02 ENCOUNTER — APPOINTMENT (OUTPATIENT)
Dept: OBGYN | Facility: CLINIC | Age: 34
End: 2024-04-02

## 2024-05-08 ENCOUNTER — APPOINTMENT (OUTPATIENT)
Dept: OBGYN | Facility: CLINIC | Age: 34
End: 2024-05-08

## 2024-06-05 ENCOUNTER — APPOINTMENT (OUTPATIENT)
Dept: OBGYN | Facility: CLINIC | Age: 34
End: 2024-06-05
Payer: COMMERCIAL

## 2024-06-05 VITALS — SYSTOLIC BLOOD PRESSURE: 100 MMHG | BODY MASS INDEX: 22.67 KG/M2 | WEIGHT: 128 LBS | DIASTOLIC BLOOD PRESSURE: 60 MMHG

## 2024-06-05 DIAGNOSIS — N94.89 OTHER SPECIFIED CONDITIONS ASSOCIATED WITH FEMALE GENITAL ORGANS AND MENSTRUAL CYCLE: ICD-10-CM

## 2024-06-05 DIAGNOSIS — N89.8 OTHER SPECIFIED NONINFLAMMATORY DISORDERS OF VAGINA: ICD-10-CM

## 2024-06-05 PROCEDURE — 56820 COLPOSCOPY VULVA: CPT

## 2024-06-05 PROCEDURE — 99204 OFFICE O/P NEW MOD 45 MIN: CPT | Mod: 25

## 2024-06-05 PROCEDURE — 87210 SMEAR WET MOUNT SALINE/INK: CPT | Mod: QW

## 2024-06-05 NOTE — ASSESSMENT
[TextEntry] : The patient has a history of vulvar burning that resolved 4 years ago following pelvic floor physical therapy.  She has been well until 2 months ago when the burning returned with a vaginal discharge and odor.  She was treated for both Candida and BV although the Candida testing was negative, Gardnerella was found.  Today, despite complaining of a vaginal discharge with a fishy odor and vulvar burning, she does not have BV based on Amsel's criteria nor does she have Candida (pending culture).  Consideration is given to the possibility that this represents contact dermatitis or occult Candida. 46  minutes of total time was spent on the date of the encounter. This included time for review of medical records and laboratory results, face to face time (including the physical examination counseling and coordination of care), time for patient education, treatment plans, answering questions, communicating with other doctors and for medical record documentation.  The time spent is separate from time spent on separately billed procedures.

## 2024-06-05 NOTE — PHYSICAL EXAM
[Chaperone Present] : A chaperone was present in the examining room during all aspects of the physical examination [TextEntry] : ***General*** General Appearance: normal; Judgment and insight: normal; the patient is oriented to time, place and person; Recent and remote memory: normal; Mood and affect: normal; Respiratory effort: normal.  ***Pelvic Examination*** External genitalia: the appearance and hair distribution are normal; no lesions are appreciated.  Urethra/urethral meatus: no masses or tenderness are appreciated; there is no scarring noted.  Bladder: nontender; there is no fullness appreciated; no masses were palpated.  Vagina:  the vagina is normally rugated; a white discharge is seen; no lesions are seen; pelvic support is adequate without any evidence of cystocele or rectocele.  Cervix: normal in appearance; no overt lesions are seen.  Uterus: Anteverted; normal in size; mobile, nontender, and well supported.  Adnexa/parametria: nontender and not enlarged; no masses are palpated.  A stool specimen was not indicated at this time.  ***Vaginal Discharge Evaluation*** A saline wet mount and KOH slide evaluation of the vaginal discharge were done.  The discharge was white; the pH was normal; the whiff test was negative; clue cells were not seen; hyphae were not seen; a large number of Lactobacilli were seen; no white blood cells were seen; superficial cells were seen; a candida culture was sent.  ***colposcopy of the vulva*** Colposcopy of the external genitalia showed that the labia majora and labia minora were normal.  She identified the introitus as the location of her pain/itching/irritation.  There was no vestibular tenderness of the anterior minor vestibular glands, and 4/10 vestibular tenderness of the posterior minor vestibular glands.  There was no evidence of other dermatopathology.  There was no erythema of the introitus.  There was no spasm of the pelvic floor.

## 2024-06-05 NOTE — HISTORY OF PRESENT ILLNESS
[FreeTextEntry1] : The patient was last seen in 2020.  She was seen for a history of vulvar burning.  Eventually, she went for pelvic floor physical therapy and the burning resolved.  2 months ago, she developed vulvar itching and burning, a vaginal discharge with a fishy odor.  She was told that she had both Candida and BV.  She was treated with fluconazole and MetroGel.  Bacterial vaginosis testing was positive for Gardnerella but negative for Candida.  The itching and burning persisted despite treatment.  Today, she has a vaginal discharge with a fishy odor, itching and burning.

## 2024-06-05 NOTE — PLAN
[FreeTextEntry1] : I recommended that she call for the results of her yeast culture in 1 week.  I recommended that she avoid irritants and gave her a list of irritants to avoid.  I told her to try using Vaseline to improve the vulvas barrier function.  I also recommended that she return for a follow up evaluation in one month.

## 2024-09-22 ENCOUNTER — NON-APPOINTMENT (OUTPATIENT)
Age: 34
End: 2024-09-22

## 2024-12-05 ENCOUNTER — NON-APPOINTMENT (OUTPATIENT)
Age: 34
End: 2024-12-05

## 2025-01-30 ENCOUNTER — NON-APPOINTMENT (OUTPATIENT)
Age: 35
End: 2025-01-30